# Patient Record
Sex: MALE | HISPANIC OR LATINO | Employment: FULL TIME | URBAN - METROPOLITAN AREA
[De-identification: names, ages, dates, MRNs, and addresses within clinical notes are randomized per-mention and may not be internally consistent; named-entity substitution may affect disease eponyms.]

---

## 2020-08-31 ENCOUNTER — HOSPITAL ENCOUNTER (EMERGENCY)
Facility: MEDICAL CENTER | Age: 25
End: 2020-08-31
Attending: EMERGENCY MEDICINE
Payer: OTHER MISCELLANEOUS

## 2020-08-31 ENCOUNTER — APPOINTMENT (OUTPATIENT)
Dept: RADIOLOGY | Facility: MEDICAL CENTER | Age: 25
End: 2020-08-31
Attending: EMERGENCY MEDICINE
Payer: OTHER MISCELLANEOUS

## 2020-08-31 VITALS
WEIGHT: 303 LBS | DIASTOLIC BLOOD PRESSURE: 67 MMHG | HEIGHT: 74 IN | RESPIRATION RATE: 20 BRPM | TEMPERATURE: 97.2 F | OXYGEN SATURATION: 98 % | HEART RATE: 102 BPM | BODY MASS INDEX: 38.89 KG/M2 | SYSTOLIC BLOOD PRESSURE: 135 MMHG

## 2020-08-31 DIAGNOSIS — R56.9 SEIZURE (HCC): ICD-10-CM

## 2020-08-31 DIAGNOSIS — G40.909 SEIZURE DISORDER (HCC): ICD-10-CM

## 2020-08-31 LAB
ALBUMIN SERPL BCP-MCNC: 5.2 G/DL (ref 3.2–4.9)
ALBUMIN/GLOB SERPL: 1.9 G/DL
ALP SERPL-CCNC: 80 U/L (ref 30–99)
ALT SERPL-CCNC: 56 U/L (ref 2–50)
ANION GAP SERPL CALC-SCNC: 16 MMOL/L (ref 7–16)
AST SERPL-CCNC: 37 U/L (ref 12–45)
BASOPHILS # BLD AUTO: 0.4 % (ref 0–1.8)
BASOPHILS # BLD: 0.05 K/UL (ref 0–0.12)
BILIRUB SERPL-MCNC: 0.4 MG/DL (ref 0.1–1.5)
BUN SERPL-MCNC: 16 MG/DL (ref 8–22)
CALCIUM SERPL-MCNC: 9.9 MG/DL (ref 8.5–10.5)
CHLORIDE SERPL-SCNC: 101 MMOL/L (ref 96–112)
CO2 SERPL-SCNC: 23 MMOL/L (ref 20–33)
CREAT SERPL-MCNC: 0.89 MG/DL (ref 0.5–1.4)
EKG IMPRESSION: NORMAL
EOSINOPHIL # BLD AUTO: 0.07 K/UL (ref 0–0.51)
EOSINOPHIL NFR BLD: 0.5 % (ref 0–6.9)
ERYTHROCYTE [DISTWIDTH] IN BLOOD BY AUTOMATED COUNT: 39.2 FL (ref 35.9–50)
ETHANOL BLD-MCNC: <10.1 MG/DL (ref 0–10.1)
GLOBULIN SER CALC-MCNC: 2.8 G/DL (ref 1.9–3.5)
GLUCOSE BLD-MCNC: 149 MG/DL (ref 65–99)
GLUCOSE SERPL-MCNC: 116 MG/DL (ref 65–99)
HCT VFR BLD AUTO: 51.8 % (ref 42–52)
HGB BLD-MCNC: 17.3 G/DL (ref 14–18)
IMM GRANULOCYTES # BLD AUTO: 0.07 K/UL (ref 0–0.11)
IMM GRANULOCYTES NFR BLD AUTO: 0.5 % (ref 0–0.9)
LYMPHOCYTES # BLD AUTO: 2.15 K/UL (ref 1–4.8)
LYMPHOCYTES NFR BLD: 15.9 % (ref 22–41)
MCH RBC QN AUTO: 28.5 PG (ref 27–33)
MCHC RBC AUTO-ENTMCNC: 33.4 G/DL (ref 33.7–35.3)
MCV RBC AUTO: 85.3 FL (ref 81.4–97.8)
MONOCYTES # BLD AUTO: 0.92 K/UL (ref 0–0.85)
MONOCYTES NFR BLD AUTO: 6.8 % (ref 0–13.4)
NEUTROPHILS # BLD AUTO: 10.23 K/UL (ref 1.82–7.42)
NEUTROPHILS NFR BLD: 75.9 % (ref 44–72)
NRBC # BLD AUTO: 0 K/UL
NRBC BLD-RTO: 0 /100 WBC
PLATELET # BLD AUTO: 284 K/UL (ref 164–446)
PMV BLD AUTO: 10.6 FL (ref 9–12.9)
POTASSIUM SERPL-SCNC: 4 MMOL/L (ref 3.6–5.5)
PROT SERPL-MCNC: 8 G/DL (ref 6–8.2)
RBC # BLD AUTO: 6.07 M/UL (ref 4.7–6.1)
SODIUM SERPL-SCNC: 140 MMOL/L (ref 135–145)
VALPROATE SERPL-MCNC: <2.8 UG/ML (ref 50–100)
WBC # BLD AUTO: 13.5 K/UL (ref 4.8–10.8)

## 2020-08-31 PROCEDURE — 80307 DRUG TEST PRSMV CHEM ANLYZR: CPT

## 2020-08-31 PROCEDURE — 70450 CT HEAD/BRAIN W/O DYE: CPT

## 2020-08-31 PROCEDURE — 85025 COMPLETE CBC W/AUTO DIFF WBC: CPT

## 2020-08-31 PROCEDURE — 80053 COMPREHEN METABOLIC PANEL: CPT

## 2020-08-31 PROCEDURE — 93005 ELECTROCARDIOGRAM TRACING: CPT | Performed by: EMERGENCY MEDICINE

## 2020-08-31 PROCEDURE — 82962 GLUCOSE BLOOD TEST: CPT

## 2020-08-31 PROCEDURE — 96374 THER/PROPH/DIAG INJ IV PUSH: CPT

## 2020-08-31 PROCEDURE — 99284 EMERGENCY DEPT VISIT MOD MDM: CPT

## 2020-08-31 PROCEDURE — 700111 HCHG RX REV CODE 636 W/ 250 OVERRIDE (IP): Performed by: EMERGENCY MEDICINE

## 2020-08-31 PROCEDURE — 36415 COLL VENOUS BLD VENIPUNCTURE: CPT

## 2020-08-31 PROCEDURE — 80164 ASSAY DIPROPYLACETIC ACD TOT: CPT

## 2020-08-31 RX ORDER — LEVETIRACETAM 10 MG/ML
1000 INJECTION INTRAVASCULAR ONCE
Status: COMPLETED | OUTPATIENT
Start: 2020-08-31 | End: 2020-08-31

## 2020-08-31 RX ADMIN — LEVETIRACETAM INJECTION 1000 MG: 10 INJECTION INTRAVENOUS at 02:36

## 2020-08-31 ASSESSMENT — LIFESTYLE VARIABLES: DO YOU DRINK ALCOHOL: NO

## 2020-08-31 ASSESSMENT — ENCOUNTER SYMPTOMS
FEVER: 0
SEIZURES: 1
COUGH: 0

## 2020-08-31 NOTE — ED NOTES
Pt discharged. Pt provided information about seizure. Pt educated to follow-up w/ neurology and to return to the hospital w/ any worsening symptoms. Pt walked to the lobby w/ his sister.

## 2020-08-31 NOTE — LETTER
"  FORM C-4:  EMPLOYEE’S CLAIM FOR COMPENSATION/ REPORT OF INITIAL TREATMENT  EMPLOYEE’S CLAIM - PROVIDE ALL INFORMATION REQUESTED   First Name Esteban Last Name Malena Birthdate 1995  Sex male Claim Number   Home Employee Address [address]                                     Zip  [address] Height  1.88 m (6' 2\") Weight  (!) 137.4 kg (303 lb) [facility]     Mailing Employee Address [address]               Zip  [address] Telephone  [phone] (home)  Primary Language Spoken  English   Insurer   Third Party   MISC WORKERS 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 (if applicable) on Break    How did this injury or occupational disease occur? Be specific and answer in detail. Use additional sheet if necessary)  I was eating then I had a seizure   If you believe that you have an occupational disease, when did you first have knowledge of the disability and it relationship to your employment? NO Witnesses to the Accident  alecia   Nature of Injury or Occupational Disease  Workers' Compensation Part(s) of Body Injured or Affected  Skull, N/A, N/A    I CERTIFY THAT THE ABOVE IS TRUE AND CORRECT TO THE BEST OF MY KNOWLEDGE AND THAT I HAVE PROVIDED THIS INFORMATION IN ORDER TO OBTAIN THE BENEFITS OF [place]).  I HEREBY AUTHORIZE ANY PHYSICIAN, CHIROPRACTOR, SURGEON, " PRACTITIONER, OR OTHER PERSON, ANY HOSPITAL, INCLUDING [facility] OR [facility], ANY MEDICAL SERVICE ORGANIZATION, ANY INSURANCE COMPANY, OR OTHER INSTITUTION OR ORGANIZATION TO RELEASE TO EACH OTHER, ANY MEDICAL OR OTHER INFORMATION, INCLUDING BENEFITS PAID OR PAYABLE, PERTINENT TO THIS INJURY OR DISEASE, EXCEPT INFORMATION RELATIVE TO DIAGNOSIS, TREATMENT AND/OR COUNSELING FOR AIDS, PSYCHOLOGICAL CONDITIONS, ALCOHOL OR CONTROLLED SUBSTANCES, FOR WHICH I MUST GIVE SPECIFIC AUTHORIZATION.  A PHOTOSTAT OF THIS AUTHORIZATION SHALL BE AS VALID AS THE ORIGINAL.  Date   08-31-20     Place                                                                           Employee’s Signature   THIS REPORT MUST BE COMPLETED AND MAILED WITHIN 3 WORKING DAYS OF TREATMENT   Place [facility], EMERGENCY DEPT                                                                             Name of Facility [facility]   Date  8/31/2020 Diagnosis  (R56.9) Seizure (HCC)  (G40.909) Seizure disorder (HCC) Is there evidence the injured employee was under the influence of alcohol and/or another controlled substance at the time of accident?   Hour  3:47 AM Description of Injury or Disease  Seizure (HCC)  Seizure disorder (HCC) No   Treatment  Seizure medication and labs and head scan  Have you advised the patient to remain off work five days or more?         No   X-Ray Findings  Negative If Yes   From Date    To Date      From information given by the employee, together with medical evidence, can you directly connect this injury or occupational disease as job incurred? No  Comments:Has a history of seizure disorder, this is not job incurred If No, is employee capable of: Full Duty  Yes Modified Duty      Is additional medical care by a physician indicated?   Comments:follow-up with neurology If Modified Duty, Specify any Limitations / Restrictions       Do you know of any previous injury or  "disease contributing to this condition or occupational disease? Yes  Comments:Patient has a history of seizure disorder this is not job related    Date 8/31/2020 Print Doctor’s Name Brionna Wright certify the employer’s copy of this form was mailed on:   Address [address] [place] [address]  [place] [employer] [id number] [phone] INSURER’S USE ONLY   Provider’s Tax ID Number [id number] Telephone Dept: [phone]    Doctor’s Signature jeronimo-BRIONNA Phillips M.D. Degree M.D.      Form C-4 (rev.10/07)                                                                         BRIEF DESCRIPTION OF RIGHTS AND BENEFITS  (Pursuant to [place] 616[place])    Notice of Injury or Occupational Disease (Incident Report Form C-1): If an injury or occupational disease (OD) arises out of and in the course of employment, you must provide written notice to your employer as soon as practicable, but no later than 7 days after the accident or OD. Your employer shall maintain a sufficient supply of the required forms.    Claim for Compensation (Form C-4): If medical treatment is sought, the form C-4 is available at the place of initial treatment. A completed \"Claim for Compensation\" (Form C-4) must be filed within 90 days after an accident or OD. The treating physician or chiropractor must, within 3 working days after treatment, complete and mail to the employer, the employer's insurer and third-party , the Claim for Compensation.    Medical Treatment: If you require medical treatment for your on-the-job injury or OD, you may be required to select a physician or chiropractor from a list provided by your workers’ compensation insurer, if it has contracted with an Organization for Managed Care (MCO) or Preferred Provider Organization (PPO) or providers of health care. If your employer has not entered into a contract with an MCO or PPO, you may select a physician or chiropractor from the Panel of Physicians and Chiropractors. Any medical costs " related to your industrial injury or OD will be paid by your insurer.    Temporary Total Disability (TTD): If your doctor has certified that you are unable to work for a period of at least 5 consecutive days, or 5 cumulative days in a 20-day period, or places restrictions on you that your employer does not accommodate, you may be entitled to TTD compensation.    Temporary Partial Disability (TPD): If the wage you receive upon reemployment is less than the compensation for TTD to which you are entitled, the insurer may be required to pay you TPD compensation to make up the difference. TPD can only be paid for a maximum of 24 months.    Permanent Partial Disability (PPD): When your medical condition is stable and there is an indication of a PPD as a result of your injury or OD, within 30 days, your insurer must arrange for an evaluation by a rating physician or chiropractor to determine the degree of your PPD. The amount of your PPD award depends on the date of injury, the results of the PPD evaluation and your age and wage.    Permanent Total Disability (PTD): If you are medically certified by a treating physician or chiropractor as permanently and totally disabled and have been granted a PTD status by your insurer, you are entitled to receive monthly benefits not to exceed 66 2/3% of your average monthly wage. The amount of your PTD payments is subject to reduction if you previously received a PPD award.    Vocational Rehabilitation Services: You may be eligible for vocational rehabilitation services if you are unable to return to the job due to a permanent physical impairment or permanent restrictions as a result of your injury or occupational disease.    Transportation and Per Geni Reimbursement: You may be eligible for travel expenses and per geni associated with medical treatment.    Reopening: You may be able to reopen your claim if your condition worsens after claim closure.     Appeal Process: If you disagree  with a written determination issued by the insurer or the insurer does not respond to your request, you may appeal to the Department of Administration, , by following the instructions contained in your determination letter. You must appeal the determination within 70 days from the date of the determination letter at [address], [address], [place], [place] [address], or [address], [address], [place], [place] [address]. If you disagree with the  decision, you may appeal to the Department of Administration, . You must file your appeal within 30 days from the date of the  decision letter at [address], [address], [place], [place] [address], or [address], [address], [place], [place] [address]. If you disagree with a decision of an , you may file a petition for judicial review with the District Court. You must do so within 30 days of the Appeal Officer’s decision. You may be represented by an  at your own expense or you may contact the [facility] for possible representation.    [place]  for Injured Workers (NAIW): If you disagree with a  decision, you may request that NAIW represent you without charge at an  Hearing. For information regarding denial of benefits, you may contact the [facility] at: [address], [address], [place] [address], [phone], or [address], [address], [place] [address], [phone]    To File a Complaint with the Division: If you wish to file a complaint with the  of the Division of Industrial Relations (DIR),  please contact the Workers’ Compensation Section, [address], [address], [place], [place] [address], telephone [phone], or [address], [address], [place], [place] [address], telephone [phone].    For assistance with Workers’ Compensation Issues: You may contact the  Office of the Governor Consumer Health Assistance, [address] [place] [address], [address], [place] [address], Toll Free 1-678.993.5390, Web site: [email or web address]., E-mail [email or web address].  D-2 (rev. 06/18)              __________________________________________________________________                                    _________________            Employee Name / Signature                                                                                                                            Date

## 2020-08-31 NOTE — ED PROVIDER NOTES
ED Provider Note    Scribed for Brionna Wright M.D. by Gulshan George. 8/31/2020, 1:44 AM.    Primary care provider: Pcp Pt States None  Means of arrival: EMS  History obtained from: Patient  History limited by: None    CHIEF COMPLAINT  Chief Complaint   Patient presents with   • Seizure     pt was on break and had witnessed seizure by coworker. pt has hx of seizure       HPI  Esteban Guy Jr. is a 25 y.o. male who presents to the Emergency Department for evaluation following a witnessed seizure that occurred while he was on break at work.  Patient denies any incontinence, does not have a headache or neck pain from the seizure.  Per EMS his blood sugar was 68. He has a history of seizures and is still on Kepra 750 mg twice daily for this. This has been his dose for about two years now. He usually takes his medication around 3 pm but did miss his evening dose today. Prior to today his last seizure was in March, and then before that was 2 years ago. He denies any recent illness, no cough or fever. He has been stressed and also did not eat very much today. Dr. Cárdenas is his neurologist.  Was previously on Depakote, however this caused liver failure and therefore he does not take this anymore.    REVIEW OF SYSTEMS  Review of Systems   Constitutional: Negative for fever.   Respiratory: Negative for cough.    Neurological: Positive for seizures.   All other systems reviewed and are negative.    PAST MEDICAL HISTORY   has a past medical history of Seizure (HCC).    SURGICAL HISTORY  patient denies any surgical history    SOCIAL HISTORY  Social History     Tobacco Use   • Smoking status: Never Smoker   • Smokeless tobacco: Never Used   Substance Use Topics   • Alcohol use: No   • Drug use: No      Social History     Substance and Sexual Activity   Drug Use No       FAMILY HISTORY  History reviewed. No pertinent family history.    CURRENT MEDICATIONS  Current Outpatient Medications   Medication Instructions   • divalproex  "ER (DEPAKOTE ER) 250 mg, Oral, 2 TIMES DAILY       ALLERGIES  No Known Allergies    PHYSICAL EXAM  VITAL SIGNS: /72   Pulse (!) 116   Temp 36.6 °C (97.8 °F) (Temporal)   Resp 14   Ht 1.88 m (6' 2\")   Wt (!) 137.4 kg (303 lb)   SpO2 93%   BMI 38.90 kg/m²   Vitals reviewed by myself.  Nursing note and vitals reviewed.  Constitutional: Well-developed and well-nourished. No acute distress.   HENT: Head is normocephalic and atraumatic.  Eyes: extra-ocular movements intact  Cardiovascular: Tachycardic rate and regular rhythm. No murmur heard.  Pulmonary/Chest: Breath sounds normal. No wheezes or rales.   Abdominal: Soft and non-tender. No distention.    Musculoskeletal: Extremities exhibit normal range of motion without edema or tenderness.  No midline spinal tenderness. patient has full range of motion of his neck  Neurological: Awake and alert.  Cranial nerves II through XII intact, no focal neurologic deficits  Skin: Skin is warm and dry. No rash.     DIAGNOSTIC STUDIES /  LABS  Labs Reviewed   CBC WITH DIFFERENTIAL - Abnormal; Notable for the following components:       Result Value    WBC 13.5 (*)     MCHC 33.4 (*)     Neutrophils-Polys 75.90 (*)     Lymphocytes 15.90 (*)     Neutrophils (Absolute) 10.23 (*)     Monos (Absolute) 0.92 (*)     All other components within normal limits   VALPROIC ACID - Abnormal; Notable for the following components:    Valproic Acid <2.8 (*)     All other components within normal limits   COMP METABOLIC PANEL - Abnormal; Notable for the following components:    Glucose 116 (*)     ALT(SGPT) 56 (*)     Albumin 5.2 (*)     All other components within normal limits   ACCU-CHEK GLUCOSE - Abnormal; Notable for the following components:    Glucose - Accu-Ck 149 (*)     All other components within normal limits   DIAGNOSTIC ALCOHOL   ESTIMATED GFR   URINE DRUG SCREEN       All labs reviewed by me.    EKG Interpretation:  Interpreted by myself    12 Lead EKG interpreted by me to " show:  EKG at 1:37 AM: Sinus tachycardia, heart rate 115, normal axis, normal intervals, , QRS 98, QTc 437, no acute ST-T segment changes, diffuse ST elevation consistent with benign early repolarization, no evidence of acute arrhythmia or ischemia  My impression of this EKG: Does not indicate ischemia or arrhythmia at this time.    RADIOLOGY  CT-HEAD W/O   Final Result         1.  No acute intracranial abnormality, stable since prior study.           The radiologist's interpretation of all radiological studies have been reviewed by me.    REASSESSMENT    1:44 AM - Patient seen and examined at bedside. Discussed plan of care, including labs and imaging. Patient agrees to the plan of care. The patient will be medicated with Keppra. Ordered for labs and imaging to evaluate his symptoms.   .  3:38 AM - Patient was reevaluated at bedside. Discussed lab and radiology results with the patient and informed them that still awaiting CT results.     3:45 AM - Patient was reevaluated at bedside. Discussed lab and radiology results with the patient and informed them that they are reassuring. Advised follow up with his neurologist to see if his medications need to be adjusted. Discussed discharge instructions and return precautions with the patient and they were cleared for discharge. Patient was given the opportunity to ask any further questions. He is comfortable with discharge at this time.      PPE Note: I verified that the patient was wearing a mask and I was wearing appropriate PPE every time I entered the room. The patient's mask was on the patient at all times during my encounter except for a brief view of the oropharynx.     Scribe remained outside the patient's room and did not have any contact with the patient for the duration of patient encounter.     COURSE & MEDICAL DECISION MAKING  Nursing notes, VS, PMSFHx reviewed in chart.    Patient is a 25-year-old male who comes in for evaluation of seizure.   Differential diagnosis includes seizure disorder, medication noncompliance, breakthrough seizure, electrolyte disturbance, intracranial abnormality.  Diagnostic work-up includes labs and CT of the head.  Of note screening EKG was done in triage which was unremarkable.  Initially patient was uncertain about his medications and thought he was on Depakote and therefore Depakote level was ordered, however patient sister ultimately clarified that patient takes Keppra after taking a picture of the medication bottle.    Patient's initial vitals are notable for tachycardia, patient is neurologically intact, he is not postictal on exam.  Patient is loaded with 1 g of Keppra.  In the emergency department patient has no recurrent seizures.  Labs returned and are unremarkable.  CT of the head demonstrates no acute abnormalities.  Therefore patient is reassured and advised to follow-up with his neurologist.  I advised him that this may have been a breakthrough seizure secondary to missing his afternoon dose of medication versus dehydration versus stress.  Patient understands and is agreeable to this plan.  He is given strict return precautions and discharged in stable condition.    The patient will return for new or worsening symptoms and is stable at the time of discharge.    The patient is referred to a primary physician for blood pressure management, diabetic screening, and for all other preventative health concerns.    DISPOSITION:  Patient will be discharged home in stable condition.    FOLLOW UP:  Santiago Cárdenas M.D.  [address]  [facility] [address]  [phone]    Schedule an appointment as soon as possible for a visit         FINAL IMPRESSION  1. Seizure (HCC)    2. Seizure disorder (HCC)        Gulshan BEAR), am scribing for, and in the presence of, Brionna Wright M.D..    Electronically signed by: Gulshan Galarza), 8/31/2020    Brionna BEAR M.D. personally performed the  services described in this documentation, as scribed by Gulshan George in my presence, and it is both accurate and complete. C      The note accurately reflects work and decisions made by me.  Brionna Wright M.D.  8/31/2020  4:44 AM

## 2020-08-31 NOTE — LETTER
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 (if applicable) on Break    How did this injury or occupational disease occur? Be specific and answer in detail. Use additional sheet if necessary)  I was eating then I had a seizure   If you believe that you have an occupational disease, when did you first have knowledge of the disability and it relationship to your employment? NO Witnesses to the Accident  alecia   Nature of Injury or Occupational Disease  Workers' Compensation Part(s) of Body Injured or Affected  Skull, N/A, N/A    I CERTIFY THAT THE ABOVE IS TRUE AND CORRECT TO THE BEST OF MY KNOWLEDGE AND THAT I HAVE PROVIDED THIS INFORMATION IN ORDER TO OBTAIN THE BENEFITS OF [place]).  I HEREBY AUTHORIZE ANY PHYSICIAN, CHIROPRACTOR, " SURGEON, PRACTITIONER, OR OTHER PERSON, ANY HOSPITAL, INCLUDING [facility] OR [facility], ANY MEDICAL SERVICE ORGANIZATION, ANY INSURANCE COMPANY, OR OTHER INSTITUTION OR ORGANIZATION TO RELEASE TO EACH OTHER, ANY MEDICAL OR OTHER INFORMATION, INCLUDING BENEFITS PAID OR PAYABLE, PERTINENT TO THIS INJURY OR DISEASE, EXCEPT INFORMATION RELATIVE TO DIAGNOSIS, TREATMENT AND/OR COUNSELING FOR AIDS, PSYCHOLOGICAL CONDITIONS, ALCOHOL OR CONTROLLED SUBSTANCES, FOR WHICH I MUST GIVE SPECIFIC AUTHORIZATION.  A PHOTOSTAT OF THIS AUTHORIZATION SHALL BE AS VALID AS THE ORIGINAL.  Date  08/31/20   [facility]                  Employee’s Signature   THIS REPORT MUST BE COMPLETED AND MAILED WITHIN 3 WORKING DAYS OF TREATMENT   Place [facility], EMERGENCY DEPT                                                                             Name of Facility [facility]   Date  8/31/2020 Diagnosis  (R56.9) Seizure (HCC)  (G40.909) Seizure disorder (HCC) Is there evidence the injured employee was under the influence of alcohol and/or another controlled substance at the time of accident?   Hour  3:56 AM Description of Injury or Disease  Seizure (HCC)  Seizure disorder (HCC) No   Treatment  Seizure medication and labs and head scan  Have you advised the patient to remain off work five days or more?         No   X-Ray Findings  Negative If Yes   From Date    To Date      From information given by the employee, together with medical evidence, can you directly connect this injury or occupational disease as job incurred? No  Comments:Has a history of seizure disorder, this is not job incurred If No, is employee capable of: Full Duty  Yes Modified Duty      Is additional medical care by a physician indicated?   Comments:follow-up with neurology If Modified Duty, Specify any Limitations / Restrictions       Do you know of any previous injury or disease  "contributing to this condition or occupational disease? Yes  Comments:Patient has a history of seizure disorder this is not job related    Date 8/31/2020 Print Doctor’s Name Brionna Wright certify the employer’s copy of this form was mailed on:   Address [address] [place] [address]  [place] [employer] [id number] [phone] INSURER’S USE ONLY   Provider’s Tax ID Number [id number] Telephone Dept: [phone]    Doctor’s Signature e-BRIONNA Phillips M.D. Degree M.D.      Form C-4 (rev.10/07)                                                                         BRIEF DESCRIPTION OF RIGHTS AND BENEFITS  (Pursuant to [place] 616C.050)    Notice of Injury or Occupational Disease (Incident Report Form C-1): If an injury or occupational disease (OD) arises out of and in the course of employment, you must provide written notice to your employer as soon as practicable, but no later than 7 days after the accident or OD. Your employer shall maintain a sufficient supply of the required forms.    Claim for Compensation (Form C-4): If medical treatment is sought, the form C-4 is available at the place of initial treatment. A completed \"Claim for Compensation\" (Form C-4) must be filed within 90 days after an accident or OD. The treating physician or chiropractor must, within 3 working days after treatment, complete and mail to the employer, the employer's insurer and third-party , the Claim for Compensation.    Medical Treatment: If you require medical treatment for your on-the-job injury or OD, you may be required to select a physician or chiropractor from a list provided by your workers’ compensation insurer, if it has contracted with an Organization for Managed Care (MCO) or Preferred Provider Organization (PPO) or providers of health care. If your employer has not entered into a contract with an MCO or PPO, you may select a physician or chiropractor from the Panel of Physicians and Chiropractors. Any medical costs related to " your industrial injury or OD will be paid by your insurer.    Temporary Total Disability (TTD): If your doctor has certified that you are unable to work for a period of at least 5 consecutive days, or 5 cumulative days in a 20-day period, or places restrictions on you that your employer does not accommodate, you may be entitled to TTD compensation.    Temporary Partial Disability (TPD): If the wage you receive upon reemployment is less than the compensation for TTD to which you are entitled, the insurer may be required to pay you TPD compensation to make up the difference. TPD can only be paid for a maximum of 24 months.    Permanent Partial Disability (PPD): When your medical condition is stable and there is an indication of a PPD as a result of your injury or OD, within 30 days, your insurer must arrange for an evaluation by a rating physician or chiropractor to determine the degree of your PPD. The amount of your PPD award depends on the date of injury, the results of the PPD evaluation and your age and wage.    Permanent Total Disability (PTD): If you are medically certified by a treating physician or chiropractor as permanently and totally disabled and have been granted a PTD status by your insurer, you are entitled to receive monthly benefits not to exceed 66 2/3% of your average monthly wage. The amount of your PTD payments is subject to reduction if you previously received a PPD award.    Vocational Rehabilitation Services: You may be eligible for vocational rehabilitation services if you are unable to return to the job due to a permanent physical impairment or permanent restrictions as a result of your injury or occupational disease.    Transportation and Per Geni Reimbursement: You may be eligible for travel expenses and per geni associated with medical treatment.    Reopening: You may be able to reopen your claim if your condition worsens after claim closure.     Appeal Process: If you disagree with a  written determination issued by the insurer or the insurer does not respond to your request, you may appeal to the Department of Administration, , by following the instructions contained in your determination letter. You must appeal the determination within 70 days from the date of the determination letter at [address], [address], [place], [place] [address], or [address], [address], [place], [place] [address]. If you disagree with the  decision, you may appeal to the Department of Administration, . You must file your appeal within 30 days from the date of the  decision letter at [address], [address], [place], [place] [address], or [address], [address], [place], [place] [address]. If you disagree with a decision of an , you may file a petition for judicial review with the District Court. You must do so within 30 days of the Appeal Officer’s decision. You may be represented by an  at your own expense or you may contact the [facility] for possible representation.    [place]  for Injured Workers (NAIW): If you disagree with a  decision, you may request that NAIW represent you without charge at an  Hearing. For information regarding denial of benefits, you may contact the [facility] at: [address], [address], [place] [address], [phone], or [address], [address], [place] [address], [phone]    To File a Complaint with the Division: If you wish to file a complaint with the  of the Division of Industrial Relations (DIR),  please contact the Workers’ Compensation Section, [address], [place], [place] [address], telephone [phone], or [address], [address], [place], [place] [address], telephone [phone].    For assistance with Workers’ Compensation Issues: You may contact the Office of  the [facility] Consumer Health Assistance, [address] [place] [address], [address], [place] [address], Toll Free 1-694.251.2779, Web site: http://[email or web address]., E-mail [email or web address].  D-2 (rev. 06/18)              __________________________________________________________________                                    08/31/20            Employee Name / Signature                                                                                                                            Date

## 2020-08-31 NOTE — ED TRIAGE NOTES
"Chief Complaint   Patient presents with   • Seizure     pt was on break and had witnessed seizure by coworker. pt has hx of seizure     Pt BIB EMS for above complaint. Pt was at work and had witnessed seizure that lasted about 30sec. Pt denies any aura or trigger for the seizure. Pt has hx of seizure and takes keppra twice daily but missed his evening dose. Pt is alert and oriented. Pt has some bite marks on his tongue. Pt complains of bilat calf pain cramping sensation. Seizure precautions in place. Pt placed in gown and VS in place.    /72   Pulse (!) 116   Temp 36.6 °C (97.8 °F) (Temporal)   Resp 14   Ht 1.88 m (6' 2\")   Wt (!) 137.4 kg (303 lb)   SpO2 93%   BMI 38.90 kg/m²     "

## 2024-07-03 ENCOUNTER — HOSPITAL ENCOUNTER (INPATIENT)
Facility: MEDICAL CENTER | Age: 29
LOS: 3 days | DRG: 281 | End: 2024-07-06
Attending: EMERGENCY MEDICINE | Admitting: INTERNAL MEDICINE
Payer: MEDICAID

## 2024-07-03 DIAGNOSIS — I21.4 NSTEMI (NON-ST ELEVATED MYOCARDIAL INFARCTION) (HCC): ICD-10-CM

## 2024-07-03 LAB
ALBUMIN SERPL BCP-MCNC: 4.4 G/DL (ref 3.2–4.9)
ALBUMIN/GLOB SERPL: 1.5 G/DL
ALP SERPL-CCNC: 64 U/L (ref 30–99)
ALT SERPL-CCNC: 27 U/L (ref 2–50)
ANION GAP SERPL CALC-SCNC: 13 MMOL/L (ref 7–16)
APTT PPP: 32.1 SEC (ref 24.7–36)
AST SERPL-CCNC: 35 U/L (ref 12–45)
BASOPHILS # BLD AUTO: 0.6 % (ref 0–1.8)
BASOPHILS # BLD: 0.04 K/UL (ref 0–0.12)
BILIRUB SERPL-MCNC: 0.6 MG/DL (ref 0.1–1.5)
BUN SERPL-MCNC: 18 MG/DL (ref 8–22)
CALCIUM ALBUM COR SERPL-MCNC: 9 MG/DL (ref 8.5–10.5)
CALCIUM SERPL-MCNC: 9.3 MG/DL (ref 8.5–10.5)
CHLORIDE SERPL-SCNC: 108 MMOL/L (ref 96–112)
CO2 SERPL-SCNC: 22 MMOL/L (ref 20–33)
CREAT SERPL-MCNC: 0.57 MG/DL (ref 0.5–1.4)
CRP SERPL HS-MCNC: 1.36 MG/DL (ref 0–0.75)
EKG IMPRESSION: NORMAL
EOSINOPHIL # BLD AUTO: 0.11 K/UL (ref 0–0.51)
EOSINOPHIL NFR BLD: 1.6 % (ref 0–6.9)
ERYTHROCYTE [DISTWIDTH] IN BLOOD BY AUTOMATED COUNT: 41 FL (ref 35.9–50)
ERYTHROCYTE [SEDIMENTATION RATE] IN BLOOD BY WESTERGREN METHOD: 11 MM/HOUR (ref 0–20)
GFR SERPLBLD CREATININE-BSD FMLA CKD-EPI: 138 ML/MIN/1.73 M 2
GLOBULIN SER CALC-MCNC: 2.9 G/DL (ref 1.9–3.5)
GLUCOSE SERPL-MCNC: 95 MG/DL (ref 65–99)
HCT VFR BLD AUTO: 46.4 % (ref 42–52)
HGB BLD-MCNC: 15.4 G/DL (ref 14–18)
IMM GRANULOCYTES # BLD AUTO: 0.02 K/UL (ref 0–0.11)
IMM GRANULOCYTES NFR BLD AUTO: 0.3 % (ref 0–0.9)
INR PPP: 1.04 (ref 0.87–1.13)
INR PPP: 1.08 (ref 0.87–1.13)
LYMPHOCYTES # BLD AUTO: 3.08 K/UL (ref 1–4.8)
LYMPHOCYTES NFR BLD: 45 % (ref 22–41)
MCH RBC QN AUTO: 27.4 PG (ref 27–33)
MCHC RBC AUTO-ENTMCNC: 33.2 G/DL (ref 32.3–36.5)
MCV RBC AUTO: 82.6 FL (ref 81.4–97.8)
MONOCYTES # BLD AUTO: 0.88 K/UL (ref 0–0.85)
MONOCYTES NFR BLD AUTO: 12.8 % (ref 0–13.4)
NEUTROPHILS # BLD AUTO: 2.72 K/UL (ref 1.82–7.42)
NEUTROPHILS NFR BLD: 39.7 % (ref 44–72)
NRBC # BLD AUTO: 0 K/UL
NRBC BLD-RTO: 0 /100 WBC (ref 0–0.2)
NT-PROBNP SERPL IA-MCNC: 41 PG/ML (ref 0–125)
PLATELET # BLD AUTO: 204 K/UL (ref 164–446)
PMV BLD AUTO: 10.4 FL (ref 9–12.9)
POTASSIUM SERPL-SCNC: 4.1 MMOL/L (ref 3.6–5.5)
PROT SERPL-MCNC: 7.3 G/DL (ref 6–8.2)
PROTHROMBIN TIME: 13.8 SEC (ref 12–14.6)
PROTHROMBIN TIME: 14.1 SEC (ref 12–14.6)
RBC # BLD AUTO: 5.62 M/UL (ref 4.7–6.1)
SODIUM SERPL-SCNC: 143 MMOL/L (ref 135–145)
TROPONIN T SERPL-MCNC: 277 NG/L (ref 6–19)
TROPONIN T SERPL-MCNC: 322 NG/L (ref 6–19)
UFH PPP CHRO-ACNC: 0.36 IU/ML
UFH PPP CHRO-ACNC: 0.52 IU/ML
WBC # BLD AUTO: 6.9 K/UL (ref 4.8–10.8)

## 2024-07-03 PROCEDURE — 770020 HCHG ROOM/CARE - TELE (206)

## 2024-07-03 PROCEDURE — 93005 ELECTROCARDIOGRAM TRACING: CPT | Performed by: EMERGENCY MEDICINE

## 2024-07-03 PROCEDURE — 85520 HEPARIN ASSAY: CPT

## 2024-07-03 PROCEDURE — 96365 THER/PROPH/DIAG IV INF INIT: CPT

## 2024-07-03 PROCEDURE — 86140 C-REACTIVE PROTEIN: CPT

## 2024-07-03 PROCEDURE — 85027 COMPLETE CBC AUTOMATED: CPT

## 2024-07-03 PROCEDURE — 80048 BASIC METABOLIC PNL TOTAL CA: CPT

## 2024-07-03 PROCEDURE — 93005 ELECTROCARDIOGRAM TRACING: CPT | Performed by: INTERNAL MEDICINE

## 2024-07-03 PROCEDURE — 700111 HCHG RX REV CODE 636 W/ 250 OVERRIDE (IP): Performed by: INTERNAL MEDICINE

## 2024-07-03 PROCEDURE — 80061 LIPID PANEL: CPT

## 2024-07-03 PROCEDURE — 99291 CRITICAL CARE FIRST HOUR: CPT | Performed by: INTERNAL MEDICINE

## 2024-07-03 PROCEDURE — 93010 ELECTROCARDIOGRAM REPORT: CPT | Performed by: INTERNAL MEDICINE

## 2024-07-03 PROCEDURE — 85652 RBC SED RATE AUTOMATED: CPT

## 2024-07-03 PROCEDURE — 85025 COMPLETE CBC W/AUTO DIFF WBC: CPT

## 2024-07-03 PROCEDURE — 83880 ASSAY OF NATRIURETIC PEPTIDE: CPT

## 2024-07-03 PROCEDURE — 80053 COMPREHEN METABOLIC PANEL: CPT

## 2024-07-03 PROCEDURE — A9270 NON-COVERED ITEM OR SERVICE: HCPCS | Performed by: INTERNAL MEDICINE

## 2024-07-03 PROCEDURE — 36415 COLL VENOUS BLD VENIPUNCTURE: CPT

## 2024-07-03 PROCEDURE — 85610 PROTHROMBIN TIME: CPT

## 2024-07-03 PROCEDURE — 700102 HCHG RX REV CODE 250 W/ 637 OVERRIDE(OP): Performed by: INTERNAL MEDICINE

## 2024-07-03 PROCEDURE — 84484 ASSAY OF TROPONIN QUANT: CPT | Mod: 91

## 2024-07-03 PROCEDURE — 94760 N-INVAS EAR/PLS OXIMETRY 1: CPT

## 2024-07-03 PROCEDURE — 99285 EMERGENCY DEPT VISIT HI MDM: CPT

## 2024-07-03 PROCEDURE — 85730 THROMBOPLASTIN TIME PARTIAL: CPT

## 2024-07-03 RX ORDER — HEPARIN SODIUM 1000 [USP'U]/ML
4000 INJECTION, SOLUTION INTRAVENOUS; SUBCUTANEOUS ONCE
Status: DISCONTINUED | OUTPATIENT
Start: 2024-07-03 | End: 2024-07-03

## 2024-07-03 RX ORDER — HEPARIN SODIUM 5000 [USP'U]/100ML
0-30 INJECTION, SOLUTION INTRAVENOUS CONTINUOUS
Status: DISCONTINUED | OUTPATIENT
Start: 2024-07-03 | End: 2024-07-03

## 2024-07-03 RX ORDER — LEVETIRACETAM 500 MG/1
1000 TABLET ORAL 2 TIMES DAILY
COMMUNITY

## 2024-07-03 RX ORDER — HEPARIN SODIUM 1000 [USP'U]/ML
2000 INJECTION, SOLUTION INTRAVENOUS; SUBCUTANEOUS PRN
Status: DISCONTINUED | OUTPATIENT
Start: 2024-07-03 | End: 2024-07-03

## 2024-07-03 RX ORDER — HEPARIN SODIUM 5000 [USP'U]/100ML
0-30 INJECTION, SOLUTION INTRAVENOUS CONTINUOUS
Status: DISCONTINUED | OUTPATIENT
Start: 2024-07-03 | End: 2024-07-05

## 2024-07-03 RX ORDER — LEVETIRACETAM 500 MG/1
1000 TABLET ORAL 2 TIMES DAILY
Status: DISCONTINUED | OUTPATIENT
Start: 2024-07-03 | End: 2024-07-06 | Stop reason: HOSPADM

## 2024-07-03 RX ORDER — ASPIRIN 81 MG/1
81 TABLET ORAL DAILY
Status: DISCONTINUED | OUTPATIENT
Start: 2024-07-04 | End: 2024-07-06 | Stop reason: HOSPADM

## 2024-07-03 RX ORDER — METOPROLOL TARTRATE 25 MG/1
12.5 TABLET, FILM COATED ORAL TWICE DAILY
Status: DISCONTINUED | OUTPATIENT
Start: 2024-07-03 | End: 2024-07-06

## 2024-07-03 RX ORDER — HYDRALAZINE HYDROCHLORIDE 20 MG/ML
10 INJECTION INTRAMUSCULAR; INTRAVENOUS EVERY 4 HOURS PRN
Status: DISCONTINUED | OUTPATIENT
Start: 2024-07-03 | End: 2024-07-06 | Stop reason: HOSPADM

## 2024-07-03 RX ORDER — ACETAMINOPHEN 325 MG/1
650 TABLET ORAL EVERY 6 HOURS PRN
Status: DISCONTINUED | OUTPATIENT
Start: 2024-07-03 | End: 2024-07-06 | Stop reason: HOSPADM

## 2024-07-03 RX ORDER — HEPARIN SODIUM 1000 [USP'U]/ML
2000 INJECTION, SOLUTION INTRAVENOUS; SUBCUTANEOUS PRN
Status: DISCONTINUED | OUTPATIENT
Start: 2024-07-03 | End: 2024-07-05

## 2024-07-03 RX ORDER — NITROGLYCERIN 0.4 MG/1
0.4 TABLET SUBLINGUAL
Status: DISCONTINUED | OUTPATIENT
Start: 2024-07-03 | End: 2024-07-06 | Stop reason: HOSPADM

## 2024-07-03 RX ORDER — MORPHINE SULFATE 4 MG/ML
2-4 INJECTION INTRAVENOUS
Status: DISCONTINUED | OUTPATIENT
Start: 2024-07-03 | End: 2024-07-06 | Stop reason: HOSPADM

## 2024-07-03 RX ADMIN — METOPROLOL TARTRATE 12.5 MG: 25 TABLET, FILM COATED ORAL at 20:42

## 2024-07-03 RX ADMIN — HEPARIN SODIUM 12 UNITS/KG/HR: 5000 INJECTION, SOLUTION INTRAVENOUS at 19:39

## 2024-07-03 RX ADMIN — LEVETIRACETAM 1000 MG: 500 TABLET, FILM COATED ORAL at 20:41

## 2024-07-03 SDOH — ECONOMIC STABILITY: TRANSPORTATION INSECURITY
IN THE PAST 12 MONTHS, HAS LACK OF RELIABLE TRANSPORTATION KEPT YOU FROM MEDICAL APPOINTMENTS, MEETINGS, WORK OR FROM GETTING THINGS NEEDED FOR DAILY LIVING?: NO

## 2024-07-03 SDOH — ECONOMIC STABILITY: TRANSPORTATION INSECURITY
IN THE PAST 12 MONTHS, HAS THE LACK OF TRANSPORTATION KEPT YOU FROM MEDICAL APPOINTMENTS OR FROM GETTING MEDICATIONS?: NO

## 2024-07-03 ASSESSMENT — COPD QUESTIONNAIRES
DURING THE PAST 4 WEEKS HOW MUCH DID YOU FEEL SHORT OF BREATH: NONE/LITTLE OF THE TIME
HAVE YOU SMOKED AT LEAST 100 CIGARETTES IN YOUR ENTIRE LIFE: NO/DON'T KNOW
DO YOU EVER COUGH UP ANY MUCUS OR PHLEGM?: NO/ONLY WITH OCCASIONAL COLDS OR INFECTIONS
COPD SCREENING SCORE: 0

## 2024-07-03 ASSESSMENT — COGNITIVE AND FUNCTIONAL STATUS - GENERAL
SUGGESTED CMS G CODE MODIFIER DAILY ACTIVITY: CH
SUGGESTED CMS G CODE MODIFIER MOBILITY: CH
MOBILITY SCORE: 24
DAILY ACTIVITIY SCORE: 24

## 2024-07-03 ASSESSMENT — LIFESTYLE VARIABLES
AVERAGE NUMBER OF DAYS PER WEEK YOU HAVE A DRINK CONTAINING ALCOHOL: 0
EVER FELT BAD OR GUILTY ABOUT YOUR DRINKING: NO
TOTAL SCORE: 0
ALCOHOL_USE: NO
AVERAGE NUMBER OF DAYS PER WEEK YOU HAVE A DRINK CONTAINING ALCOHOL: 0
EVER FELT BAD OR GUILTY ABOUT YOUR DRINKING: NO
ALCOHOL_USE: NO
HOW MANY TIMES IN THE PAST YEAR HAVE YOU HAD 5 OR MORE DRINKS IN A DAY: 0
DOES PATIENT WANT TO STOP DRINKING: NO
CONSUMPTION TOTAL: INCOMPLETE
HOW MANY TIMES IN THE PAST YEAR HAVE YOU HAD 5 OR MORE DRINKS IN A DAY: 0
ON A TYPICAL DAY WHEN YOU DRINK ALCOHOL HOW MANY DRINKS DO YOU HAVE: 0
EVER HAD A DRINK FIRST THING IN THE MORNING TO STEADY YOUR NERVES TO GET RID OF A HANGOVER: NO
HAVE PEOPLE ANNOYED YOU BY CRITICIZING YOUR DRINKING: NO
ON A TYPICAL DAY WHEN YOU DRINK ALCOHOL HOW MANY DRINKS DO YOU HAVE: 0
HAVE YOU EVER FELT YOU SHOULD CUT DOWN ON YOUR DRINKING: NO
CONSUMPTION TOTAL: NEGATIVE
TOTAL SCORE: 0
HAVE YOU EVER FELT YOU SHOULD CUT DOWN ON YOUR DRINKING: NO
DOES PATIENT WANT TO STOP DRINKING: NO
TOTAL SCORE: 0
HAVE PEOPLE ANNOYED YOU BY CRITICIZING YOUR DRINKING: NO

## 2024-07-03 ASSESSMENT — SOCIAL DETERMINANTS OF HEALTH (SDOH)
IN THE PAST 12 MONTHS, HAS THE ELECTRIC, GAS, OIL, OR WATER COMPANY THREATENED TO SHUT OFF SERVICE IN YOUR HOME?: NO
WITHIN THE PAST 12 MONTHS, YOU WORRIED THAT YOUR FOOD WOULD RUN OUT BEFORE YOU GOT THE MONEY TO BUY MORE: NEVER TRUE
WITHIN THE LAST YEAR, HAVE TO BEEN RAPED OR FORCED TO HAVE ANY KIND OF SEXUAL ACTIVITY BY YOUR PARTNER OR EX-PARTNER?: NO
WITHIN THE PAST 12 MONTHS, THE FOOD YOU BOUGHT JUST DIDN'T LAST AND YOU DIDN'T HAVE MONEY TO GET MORE: NEVER TRUE
WITHIN THE LAST YEAR, HAVE YOU BEEN HUMILIATED OR EMOTIONALLY ABUSED IN OTHER WAYS BY YOUR PARTNER OR EX-PARTNER?: NO
WITHIN THE LAST YEAR, HAVE YOU BEEN KICKED, HIT, SLAPPED, OR OTHERWISE PHYSICALLY HURT BY YOUR PARTNER OR EX-PARTNER?: NO
WITHIN THE LAST YEAR, HAVE YOU BEEN AFRAID OF YOUR PARTNER OR EX-PARTNER?: NO

## 2024-07-03 ASSESSMENT — PATIENT HEALTH QUESTIONNAIRE - PHQ9
2. FEELING DOWN, DEPRESSED, IRRITABLE, OR HOPELESS: NOT AT ALL
1. LITTLE INTEREST OR PLEASURE IN DOING THINGS: NOT AT ALL
SUM OF ALL RESPONSES TO PHQ9 QUESTIONS 1 AND 2: 0

## 2024-07-03 ASSESSMENT — ENCOUNTER SYMPTOMS: SHORTNESS OF BREATH: 1

## 2024-07-03 ASSESSMENT — FIBROSIS 4 INDEX
FIB4 SCORE: 0.86
FIB4 SCORE: 0.86

## 2024-07-03 ASSESSMENT — PAIN DESCRIPTION - PAIN TYPE: TYPE: ACUTE PAIN

## 2024-07-04 ENCOUNTER — APPOINTMENT (OUTPATIENT)
Dept: CARDIOLOGY | Facility: MEDICAL CENTER | Age: 29
DRG: 281 | End: 2024-07-04
Attending: INTERNAL MEDICINE
Payer: MEDICAID

## 2024-07-04 ENCOUNTER — APPOINTMENT (OUTPATIENT)
Dept: RADIOLOGY | Facility: MEDICAL CENTER | Age: 29
DRG: 281 | End: 2024-07-04
Payer: MEDICAID

## 2024-07-04 LAB
AMPHET UR QL SCN: NEGATIVE
ANION GAP SERPL CALC-SCNC: 11 MMOL/L (ref 7–16)
BARBITURATES UR QL SCN: NEGATIVE
BENZODIAZ UR QL SCN: NEGATIVE
BUN SERPL-MCNC: 17 MG/DL (ref 8–22)
BZE UR QL SCN: NEGATIVE
CALCIUM SERPL-MCNC: 9.1 MG/DL (ref 8.5–10.5)
CANNABINOIDS UR QL SCN: NEGATIVE
CHLORIDE SERPL-SCNC: 107 MMOL/L (ref 96–112)
CHOLEST SERPL-MCNC: 159 MG/DL (ref 100–199)
CO2 SERPL-SCNC: 24 MMOL/L (ref 20–33)
CREAT SERPL-MCNC: 0.63 MG/DL (ref 0.5–1.4)
EKG IMPRESSION: NORMAL
ERYTHROCYTE [DISTWIDTH] IN BLOOD BY AUTOMATED COUNT: 42 FL (ref 35.9–50)
FENTANYL UR QL: NEGATIVE
GFR SERPLBLD CREATININE-BSD FMLA CKD-EPI: 134 ML/MIN/1.73 M 2
GLUCOSE SERPL-MCNC: 95 MG/DL (ref 65–99)
HCT VFR BLD AUTO: 44.9 % (ref 42–52)
HDLC SERPL-MCNC: 31 MG/DL
HGB BLD-MCNC: 14.7 G/DL (ref 14–18)
LDLC SERPL CALC-MCNC: 104 MG/DL
LV EJECT FRACT  99904: 55
LV EJECT FRACT MOD 2C 99903: 50.82
LV EJECT FRACT MOD 4C 99902: 50.16
LV EJECT FRACT MOD BP 99901: 50
MCH RBC QN AUTO: 27.4 PG (ref 27–33)
MCHC RBC AUTO-ENTMCNC: 32.7 G/DL (ref 32.3–36.5)
MCV RBC AUTO: 83.8 FL (ref 81.4–97.8)
METHADONE UR QL SCN: NEGATIVE
OPIATES UR QL SCN: NEGATIVE
OXYCODONE UR QL SCN: NEGATIVE
PCP UR QL SCN: NEGATIVE
PLATELET # BLD AUTO: 206 K/UL (ref 164–446)
PMV BLD AUTO: 10.4 FL (ref 9–12.9)
POTASSIUM SERPL-SCNC: 3.8 MMOL/L (ref 3.6–5.5)
PROPOXYPH UR QL SCN: NEGATIVE
RBC # BLD AUTO: 5.36 M/UL (ref 4.7–6.1)
RHEUMATOID FACT SER IA-ACNC: <10 IU/ML (ref 0–14)
SODIUM SERPL-SCNC: 142 MMOL/L (ref 135–145)
TRIGL SERPL-MCNC: 122 MG/DL (ref 0–149)
TROPONIN T SERPL-MCNC: 232 NG/L (ref 6–19)
TROPONIN T SERPL-MCNC: 282 NG/L (ref 6–19)
TROPONIN T SERPL-MCNC: 347 NG/L (ref 6–19)
UFH PPP CHRO-ACNC: 0.5 IU/ML
UFH PPP CHRO-ACNC: 0.66 IU/ML
WBC # BLD AUTO: 8.1 K/UL (ref 4.8–10.8)

## 2024-07-04 PROCEDURE — 93010 ELECTROCARDIOGRAM REPORT: CPT | Performed by: INTERNAL MEDICINE

## 2024-07-04 PROCEDURE — A9270 NON-COVERED ITEM OR SERVICE: HCPCS | Performed by: INTERNAL MEDICINE

## 2024-07-04 PROCEDURE — 99254 IP/OBS CNSLTJ NEW/EST MOD 60: CPT | Mod: FS | Performed by: INTERNAL MEDICINE

## 2024-07-04 PROCEDURE — 86431 RHEUMATOID FACTOR QUANT: CPT

## 2024-07-04 PROCEDURE — 99233 SBSQ HOSP IP/OBS HIGH 50: CPT | Mod: GC | Performed by: HOSPITALIST

## 2024-07-04 PROCEDURE — 770020 HCHG ROOM/CARE - TELE (206)

## 2024-07-04 PROCEDURE — 93306 TTE W/DOPPLER COMPLETE: CPT | Mod: 26 | Performed by: INTERNAL MEDICINE

## 2024-07-04 PROCEDURE — 86658 ENTEROVIRUS ANTIBODY: CPT

## 2024-07-04 PROCEDURE — 86039 ANTINUCLEAR ANTIBODIES (ANA): CPT

## 2024-07-04 PROCEDURE — 86225 DNA ANTIBODY NATIVE: CPT

## 2024-07-04 PROCEDURE — 85520 HEPARIN ASSAY: CPT

## 2024-07-04 PROCEDURE — 86235 NUCLEAR ANTIGEN ANTIBODY: CPT | Mod: 91

## 2024-07-04 PROCEDURE — 80307 DRUG TEST PRSMV CHEM ANLYZR: CPT

## 2024-07-04 PROCEDURE — 71045 X-RAY EXAM CHEST 1 VIEW: CPT

## 2024-07-04 PROCEDURE — 700117 HCHG RX CONTRAST REV CODE 255: Performed by: INTERNAL MEDICINE

## 2024-07-04 PROCEDURE — 86038 ANTINUCLEAR ANTIBODIES: CPT

## 2024-07-04 PROCEDURE — 93306 TTE W/DOPPLER COMPLETE: CPT

## 2024-07-04 PROCEDURE — 36415 COLL VENOUS BLD VENIPUNCTURE: CPT

## 2024-07-04 PROCEDURE — 93005 ELECTROCARDIOGRAM TRACING: CPT

## 2024-07-04 PROCEDURE — 700102 HCHG RX REV CODE 250 W/ 637 OVERRIDE(OP): Performed by: INTERNAL MEDICINE

## 2024-07-04 PROCEDURE — 84484 ASSAY OF TROPONIN QUANT: CPT

## 2024-07-04 PROCEDURE — 700111 HCHG RX REV CODE 636 W/ 250 OVERRIDE (IP): Performed by: INTERNAL MEDICINE

## 2024-07-04 RX ORDER — SODIUM CHLORIDE 9 MG/ML
INJECTION, SOLUTION INTRAVENOUS CONTINUOUS
Status: DISCONTINUED | OUTPATIENT
Start: 2024-07-05 | End: 2024-07-06

## 2024-07-04 RX ADMIN — LEVETIRACETAM 1000 MG: 500 TABLET, FILM COATED ORAL at 16:37

## 2024-07-04 RX ADMIN — LEVETIRACETAM 1000 MG: 500 TABLET, FILM COATED ORAL at 05:20

## 2024-07-04 RX ADMIN — METOPROLOL TARTRATE 12.5 MG: 25 TABLET, FILM COATED ORAL at 05:20

## 2024-07-04 RX ADMIN — METOPROLOL TARTRATE 12.5 MG: 25 TABLET, FILM COATED ORAL at 16:37

## 2024-07-04 RX ADMIN — ACETAMINOPHEN 650 MG: 325 TABLET, FILM COATED ORAL at 20:46

## 2024-07-04 RX ADMIN — HUMAN ALBUMIN MICROSPHERES AND PERFLUTREN 3 ML: 10; .22 INJECTION, SOLUTION INTRAVENOUS at 10:04

## 2024-07-04 RX ADMIN — HEPARIN SODIUM 12 UNITS/KG/HR: 5000 INJECTION, SOLUTION INTRAVENOUS at 16:45

## 2024-07-04 RX ADMIN — ASPIRIN 81 MG: 81 TABLET, COATED ORAL at 05:20

## 2024-07-04 ASSESSMENT — PAIN DESCRIPTION - PAIN TYPE
TYPE: ACUTE PAIN
TYPE: ACUTE PAIN

## 2024-07-04 ASSESSMENT — ENCOUNTER SYMPTOMS
WHEEZING: 0
CHOKING: 0
APNEA: 0
STRIDOR: 0
COUGH: 0
CHEST TIGHTNESS: 0
SHORTNESS OF BREATH: 0

## 2024-07-04 ASSESSMENT — PATIENT HEALTH QUESTIONNAIRE - PHQ9
1. LITTLE INTEREST OR PLEASURE IN DOING THINGS: NOT AT ALL
2. FEELING DOWN, DEPRESSED, IRRITABLE, OR HOPELESS: NOT AT ALL
SUM OF ALL RESPONSES TO PHQ9 QUESTIONS 1 AND 2: 0

## 2024-07-04 ASSESSMENT — FIBROSIS 4 INDEX: FIB4 SCORE: 0.85

## 2024-07-04 NOTE — ED TRIAGE NOTES
Chief Complaint   Patient presents with    Chest Pain    Sent by MD     Transferred from [facility] for NonSTEMI. Aspirin and Lovenox given at transferring facility.      Cardiac, spO2 and bp monitor initiated.

## 2024-07-04 NOTE — CARE PLAN
The patient is Watcher - Medium risk of patient condition declining or worsening    Shift Goals  Clinical Goals: Maintain heparin infusion, Monitor Xa, pain level, labs. Rest  Patient Goals: Pain control/rest  Family Goals: Recovery (Mother at bedside.)    Progress made toward(s) clinical / shift goals:      Problem: Knowledge Deficit - Standard  Goal: Patient and family/care givers will demonstrate understanding of plan of care, disease process/condition, diagnostic tests and medications  Description: Target End Date:  1-3 days or as soon as patient condition allows    Document in Patient Education    1.  Patient and family/caregiver oriented to unit, equipment, visitation policy and means for communicating concern  2.  Complete/review Learning Assessment  3.  Assess knowledge level of disease process/condition, treatment plan, diagnostic tests and medications  4.  Explain disease process/condition, treatment plan, diagnostic tests and medications  Outcome: Progressing  Note: Encouraged Pt to perform oral care after evening meal to help protect himself from developing a respiratory infection.     21:04 Xa 0.52 Therapeutic. No changes just verification.  No report of pain during assessment Pt sleeping with ventilation noted.         Patient is not progressing towards the following goals:

## 2024-07-04 NOTE — ED NOTES
Med Rec complete per patient, S/O and RX bottle at bedside   Allergies reviewed  Antibiotics in the past 30 days:no  Anticoagulant in past 14 days:no  Pharmacy patient utilizes:[facility] in [place]

## 2024-07-04 NOTE — NON-PROVIDER
"  [facility] INTERNAL MEDICINE PROGRESS NOTE   Medical Student Note    Attending: Skip Shah M.D.  Senior Resident: Abram Orosco D.O. (PGY-3)  Medical Student: Arina Hallman, MS3  PATIENT: Esteban Guy; [id number]; 1995    Hospital Day # Hospital Day: 2    ID: 28 y.o. male with past medical history of epilepsy was admitted on 7/3/2024   for chest pain.     24 Hour Events: No acute events overnight     SUBJECTIVE: Mr. Guy was transferred from [facility] for further evaluation of an elevated troponin level. Mr. Guy told me he experienced two episodes of chest pain with the first taking place three days ago while he was eating breakfast around 8-9am. He describes the pain as a \"pressure\" that extended into his throat and proceeded down his left arm. Pain was 4/10 in severity accompanied with SOB. Prior to the the onset of his first episode he experienced a \"severe headache\" the day prior. States tylenol did not help with symptom relief. He notes the chest pain subsided around 12pm that day.     On 7/3 Mr. Guy experienced the same chest pain he had two days prior but this time categorized it as 6/10 in severity. The pain began at the same time at which point he told his mother about the pain who then brought him to the hospital. He notes prior to his transfer to [facility] his pain subsided. He mentioned fast food is a common part of his diet. When asked on average how often he consumes fast food within a week, he says he \"could not count\". Denies recent stress, sick contacts and consumption of energy drinks.     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 Denies the use of alcohol, tobacco and other illicit drugs. He states he use to smoke weed once a week but quit a year ago.      Family medical history: A maternal aunt with rheumatoid arthritis.     ROS:  Constitutional: Denies fever, chills, diaphoresis.  Cardio: Denies palpitations  Pulm: Shortness of breath.  Extremities: Denies numbness and " paraesthesias bilaterally.      Update Interval:  - USD was negative   - TTE shows normal anatomy and function.   - Coxsackie A and B panel ordered.  - BERT and rheumatoid factor were also ordered.      OBJECTIVE:  Temp:  [36 °C (96.8 °F)-36.6 °C (97.9 °F)] 36.5 °C (97.7 °F)  Pulse:  [64-76] 69  Resp:  [13-29] 16  BP: (113-141)/(64-89) 136/81  SpO2:  [95 %-98 %] 96 %    Intake/Output Summary (Last 24 hours) at 7/4/2024 0852  Last data filed at 7/4/2024 0400  Gross per 24 hour   Intake 460 ml   Output 0 ml   Net 460 ml       PE:  Gen: No acute distress, resting comfortably in bed  HEENT: normocephalic, atraumatic, EOMI  Pulm: clear to auscultation bilaterally, no respiratory distress   Cardio: RRR, no M/R/G. Costochondral region was non tender. Carotid arteries clear to auscultation. Radial pulses present. Capillary refill was less that 2 seconds.   Abdom: soft, nontender, nondistended, normoactive bowel sounds in all quadrants  Ext: No edema, 2+ pulses bilaterally  Neuro: Grossly intact    LABS:  Recent Labs     07/03/24 1728 07/03/24 2346   WBC 6.9 8.1   RBC 5.62 5.36   HEMOGLOBIN 15.4 14.7   HEMATOCRIT 46.4 44.9   MCV 82.6 83.8   MCH 27.4 27.4   RDW 41.0 42.0   PLATELETCT 204 206   MPV 10.4 10.4   NEUTSPOLYS 39.70*  --    LYMPHOCYTES 45.00*  --    MONOCYTES 12.80  --    EOSINOPHILS 1.60  --    BASOPHILS 0.60  --      Recent Labs     07/03/24  1728 07/03/24  2346   SODIUM 143 142   POTASSIUM 4.1 3.8   CHLORIDE 108 107   CO2 22 24   BUN 18 17   CREATININE 0.57 0.63   CALCIUM 9.3 9.1   ALBUMIN 4.4  --      Estimated GFR/CRCL = Estimated Creatinine Clearance: 245.3 mL/min (by C-G formula based on SCr of 0.63 mg/dL).  Recent Labs     07/03/24 1728 07/03/24  2346   GLUCOSE 95 95     Recent Labs     07/03/24  1728 07/03/24  2104   ASTSGOT 35  --    ALTSGPT 27  --    TBILIRUBIN 0.6  --    ALKPHOSPHAT 64  --    GLOBULIN 2.9  --    INR 1.04 1.08             Recent Labs     07/03/24  1728 07/03/24  2104   INR 1.04 1.08    APTT 32.1  --        MICROBIOLOGY:   Blood Culture   Date Value Ref Range Status   01/21/2014 No growth after 5 days of incubation.  Final        IMAGING:   EC-ECHOCARDIOGRAM COMPLETE W/O CONT    (Results Pending)       MEDS:  Current Facility-Administered Medications   Medication Last Admin    heparin infusion 25,000 units in 500 mL 0.45% NACL 12 Units/kg/hr at 07/04/24 0720    heparin injection 2,000 Units      Respiratory Therapy Consult      acetaminophen (Tylenol) tablet 650 mg      aspirin EC tablet 81 mg 81 mg at 07/04/24 0520    hydrALAZINE (Apresoline) injection 10 mg      nitroglycerin (Nitrostat) tablet 0.4 mg      morphine 4 MG/ML injection 2-4 mg      metoprolol tartrate (Lopressor) tablet 12.5 mg 12.5 mg at 07/04/24 0520    levETIRAcetam (Keppra) tablet 1,000 mg 1,000 mg at 07/04/24 0520       PROBLEM LIST:  Epilepsy - managed with Keppra     ASSESSMENT/PLAN: 28 y.o. male with past medical history of epilepsy was admitted for chest pain and being managed for NSTEMI.     #NSTEMI  - TTE shows normal anatomy and function with an EF of 55%. Cardiology plans to take him to the cath lab tomorrow.       Arina Hallman, MS3  [employer]

## 2024-07-04 NOTE — ASSESSMENT & PLAN NOTE
Patient will be admitted to the telemetry unit with close cardiac monitoring, serial EKG and troponin  Differential includes ACS, coronary vasospasm, myocarditis.   Patient has been given full dose of aspirin and is started on IV heparin, monitor Xa  Nitro and morphine when necessary for chest pain  Workup for possible underlying etiology:   - Ordered Coxsackie panel.   -Ordered BERT reflexive panel.  Rheumatoid factor negative.   Based on coronary angiography findings possible vasospastic etiology suspected.  Started 2.5 Mg daily amlodipine.  Ordered cardiac MRI.  Consulted cardiology:    Continue aspirin 81 mg daily.  -On low-dose metoprolol 12.5 twice daily.

## 2024-07-04 NOTE — CARE PLAN
The patient is Stable - Low risk of patient condition declining or worsening    Shift Goals  Clinical Goals: Heparin gtt, labs, cards consult  Patient Goals: Comfort, answers  Family Goals: recovery    Progress made toward(s) clinical / shift goals:    Problem: Hemodynamics  Goal: Patient's hemodynamics, fluid balance and neurologic status will be stable or improve  Description: Target End Date:  Prior to discharge or change in level of care    Document on Assessment and I/O flowsheet templates    1.  Monitor vital signs, pulse oximetry and cardiac monitor per provider order and/or policy  2.  Maintain blood pressure per provider order  3.  Hemodynamic monitoring per provider order  4.  Manage IV fluids and IV infusions  5.  Monitor intake and output  6.  Daily weights per unit policy or provider order  7.  Assess peripheral pulses and capillary refill  8.  Assess color and body temperature  9.  Position patient for maximum circulation/cardiac output  10. Monitor for signs/symptoms of excessive bleeding  11. Assess mental status, restlessness and changes in level of consciousness  12. Monitor temperature and report fever or hypothermia to provider immediately. Consideration of targeted temperature management.  Outcome: Progressing  Note: Pt remained hemodynamically stable evidenced by stable vital sings, proper urine output and adequate fluid intake.       Problem: Urinary Elimination  Goal: Establish and maintain regular urinary output  Description: Target End Date:  Prior to discharge or change in level of care    Document on I/O and Assessment flowsheets    1.  Evaluate need to continue indwelling catheter every shift  2.  Assess signs and symptoms of urinary retention  3.  Assess post-void residual volumes  4.  Implement bladder training program  5.  Encourage scheduled voidings  6.  Assist patient to sit on bedside commode or toilet for voiding  7.  Educate patient and family/caregiver on use and purpose of  urine collection devices (document in Patient Education)  Outcome: Progressing  Note: Patient able to eliminate effectively. UDS collected and sent to lab.

## 2024-07-04 NOTE — ED PROVIDER NOTES
ED Provider Note    CHIEF COMPLAINT  Chief Complaint   Patient presents with    Chest Pain    Sent by MD     Transferred from [facility] for NonSTEMI. Aspirin and Lovenox given at transferring facility.       EXTERNAL RECORDS REVIEWED  External ED Note outside hospital, troponin elevated at 120 also reviewed notes from neurology clinic 2023 partial symptomatic epilepsy    HPI/ROS  LIMITATION TO HISTORY   Select: : None  OUTSIDE HISTORIAN(S):  EMS      Esteban Guy Jr. is a 28 y.o. male who presents to the ED with chest pain.  The patient has been having intermittent episodes of chest pain over the last couple of days, this morning lasted for a few hours, central chest, rating up into his neck.  To where he went to the ER, once he was in the ER he was chest pain-free.  The patient's troponin came back elevated at 120 the patient was transferred here.  The patient denies any diabetes, hypertension, heart attacks, no family history of heart attacks, does not smoke.  The patient has not had any fevers runny nose cough congestion recently.  The pain when it was present was not exertional, no alleviation or aggravating factors.    PAST MEDICAL HISTORY   has a past medical history of Seizure (HCC).    SURGICAL HISTORY  patient denies any surgical history    FAMILY HISTORY  No family history on file.    SOCIAL HISTORY  Social History     Tobacco Use    Smoking status: Never    Smokeless tobacco: Never   Vaping Use    Vaping status: Some Days   Substance and Sexual Activity    Alcohol use: No    Drug use: No    Sexual activity: Not on file       CURRENT MEDICATIONS  Home Medications       Reviewed by Kimo Guerrero (Pharmacy Tech) on 07/03/24 at 1839  Med List Status: Complete     Medication Last Dose Status   levETIRAcetam (KEPPRA) 500 MG Tab 7/3/2024 Active                    ALLERGIES  No Known Allergies    PHYSICAL EXAM  VITAL SIGNS: /64   Pulse 72   Temp 36 °C (96.8 °F) (Temporal)   Resp 18   Ht  "1.803 m (5' 11\")   Wt (!) 140 kg (309 lb 8.4 oz)   SpO2 95%   BMI 43.17 kg/m²    Well-developed and nourished 28-year-old male who appears in mild distress  Atraumatic, normocephalic, oropharynx is clear  Neck is supple  Chest clear to auscultation  Regular rhythm  Abdomen soft nontender  Extremities no deformity seen    EKG/LABS  Results for orders placed or performed during the hospital encounter of 07/03/24   CBC w/ Differential   Result Value Ref Range    WBC 6.9 4.8 - 10.8 K/uL    RBC 5.62 4.70 - 6.10 M/uL    Hemoglobin 15.4 14.0 - 18.0 g/dL    Hematocrit 46.4 42.0 - 52.0 %    MCV 82.6 81.4 - 97.8 fL    MCH 27.4 27.0 - 33.0 pg    MCHC 33.2 32.3 - 36.5 g/dL    RDW 41.0 35.9 - 50.0 fL    Platelet Count 204 164 - 446 K/uL    MPV 10.4 9.0 - 12.9 fL    Neutrophils-Polys 39.70 (L) 44.00 - 72.00 %    Lymphocytes 45.00 (H) 22.00 - 41.00 %    Monocytes 12.80 0.00 - 13.40 %    Eosinophils 1.60 0.00 - 6.90 %    Basophils 0.60 0.00 - 1.80 %    Immature Granulocytes 0.30 0.00 - 0.90 %    Nucleated RBC 0.00 0.00 - 0.20 /100 WBC    Neutrophils (Absolute) 2.72 1.82 - 7.42 K/uL    Lymphs (Absolute) 3.08 1.00 - 4.80 K/uL    Monos (Absolute) 0.88 (H) 0.00 - 0.85 K/uL    Eos (Absolute) 0.11 0.00 - 0.51 K/uL    Baso (Absolute) 0.04 0.00 - 0.12 K/uL    Immature Granulocytes (abs) 0.02 0.00 - 0.11 K/uL    NRBC (Absolute) 0.00 K/uL   Complete Metabolic Panel (CMP)   Result Value Ref Range    Sodium 143 135 - 145 mmol/L    Potassium 4.1 3.6 - 5.5 mmol/L    Chloride 108 96 - 112 mmol/L    Co2 22 20 - 33 mmol/L    Anion Gap 13.0 7.0 - 16.0    Glucose 95 65 - 99 mg/dL    Bun 18 8 - 22 mg/dL    Creatinine 0.57 0.50 - 1.40 mg/dL    Calcium 9.3 8.5 - 10.5 mg/dL    Correct Calcium 9.0 8.5 - 10.5 mg/dL    AST(SGOT) 35 12 - 45 U/L    ALT(SGPT) 27 2 - 50 U/L    Alkaline Phosphatase 64 30 - 99 U/L    Total Bilirubin 0.6 0.1 - 1.5 mg/dL    Albumin 4.4 3.2 - 4.9 g/dL    Total Protein 7.3 6.0 - 8.2 g/dL    Globulin 2.9 1.9 - 3.5 g/dL    A-G Ratio " 1.5 g/dL   Troponin - STAT Once   Result Value Ref Range    Troponin T 277 (H) 6 - 19 ng/L   CRP QUANTITIVE (NON-CARDIAC)   Result Value Ref Range    Stat C-Reactive Protein 1.36 (H) 0.00 - 0.75 mg/dL   proBrain Natriuretic Peptide, NT   Result Value Ref Range    NT-proBNP 41 0 - 125 pg/mL   ESTIMATED GFR   Result Value Ref Range    GFR (CKD-EPI) 138 >60 mL/min/1.73 m 2   EKG   Result Value Ref Range    Report       [facility] Emergency Dept.    Test Date:  2024  Pt Name:    ALVINA GONZALEZ                 Department: ER  MRN:        [id number]                      Room:        03  Gender:     Male                         Technician: [id number]  :        1995                   Requested By:PARAG TOMAS  Order #:    [id number]                    Reading MD: PARAG TOMAS MD    Measurements  Intervals                                Axis  Rate:       69                           P:          7  ID:         [id number]                          QRS:        39  QRSD:       102                          T:          24  QT:         398  QTc:        427    Interpretive Statements  Sinus rhythm  ST elev, probable normal early repol pattern  Compared to ECG 2020 01:37:13  Sinus tachycardia no longer present  ST (T wave) deviation still present  Electronically Signed On 2024 18:01:33 PDT by PARAG TOMAS MD        I have independently interpreted this EKG    X-ray from outside facility shows no acute cardiopulmonary disease      COURSE & MEDICAL DECISION MAKING    ASSESSMENT, COURSE AND PLAN  Care Narrative: Patient is coming in transfer to outside facility secondary to elevated troponin, the patient has no chest pain now, EKG does not show any ST elevation, does not show any signs of pericarditis.  The patient's troponin is elevated I will start the patient on a heparin drip.  Will discuss case with the hospitalist for hospitalization.        DISPOSITION AND DISCUSSIONS  I have  discussed management of the patient with the following physicians and NARGIS's: Hospitalist    FINAL DIAGNOSIS  1. NSTEMI (non-ST elevated myocardial infarction) (HCC)           Electronically signed by: Allen Leigh M.D., 7/3/2024 5:18 PM

## 2024-07-04 NOTE — CONSULTS
Cardiology Initial Consultation    Date of Service  7/4/2024    Referring Physician  MATY Shah M.D.    Reason for Consultation    Elevated troponin    History of Presenting Illness  Esteban Guy Jr. is a 28 y.o. male with no past cardiac history, seizure disorder on Keppra who presented 7/3/2024 from [facility] for chest pain and elevated troponin.      No social bad habits, denies smoking, no alcohol, no drug abuse.  No family history of CAD.      Denies having recent cold/flu/cough/fever/URI recently.        Patient reports episode of chest pain 3 days ago , he was laying in bed playing with iPhone, described chest pain as heaviness radiating to his throat lasted couple of hours.  No alleviating or aggravating factors.  The second episode of chest pain with the same intensity and duration was yesterday that prompted him to come to ER for further evaluation.    Labs reviewed  Troponin peaked at 347.  UDS negative.    Echocardiogram reassuring LVEF 55% normal regional wall motion.      EKG sinus rhythm no evidence of acute ischemia      Review of Systems  Review of Systems   Respiratory:  Negative for apnea, cough, choking, chest tightness, shortness of breath, wheezing and stridor.        Past Medical History   has a past medical history of Seizure (HCC).  Seizure disorder  Surgical History   has no past surgical history on file.  No pertinent surgical hx  Family History  family history is not on file.  No prior family history of CAD  Social History   reports that he has never smoked. He has never used smokeless tobacco. He reports that he does not drink alcohol and does not use drugs.    Medications  Prior to Admission Medications   Prescriptions Last Dose Informant Patient Reported? Taking?   Acetaminophen (TYLENOL PO) COUPLE DAYS AGO at PRN Patient, Significant Other Yes Yes   Sig: Take 2 Tablets by mouth one time as needed (headache).   levETIRAcetam (KEPPRA) 500 MG Tab 7/3/2024 at AM Patient,  Significant Other, Rx Bottle (For Med Information) Yes Yes   Sig: Take 1,000 mg by mouth 2 times a day. 1,000 mg = 2 tabs      Facility-Administered Medications: None       Allergies  No Known Allergies    Vital signs in last 24 hours  Temp:  [36 °C (96.8 °F)-36.6 °C (97.9 °F)] 36.5 °C (97.7 °F)  Pulse:  [64-76] 69  Resp:  [13-29] 16  BP: (113-141)/(64-89) 136/81  SpO2:  [95 %-98 %] 96 %    Physical Exam  Physical Exam  Cardiovascular:      Rate and Rhythm: Normal rate and regular rhythm.      Pulses: Normal pulses.   Pulmonary:      Effort: Pulmonary effort is normal.   Skin:     General: Skin is warm.   Neurological:      Mental Status: He is alert. Mental status is at baseline.   Psychiatric:         Mood and Affect: Mood normal.         Lab Review  Lab Results   Component Value Date/Time    WBC 8.1 07/03/2024 11:46 PM    RBC 5.36 07/03/2024 11:46 PM    HEMOGLOBIN 14.7 07/03/2024 11:46 PM    HEMATOCRIT 44.9 07/03/2024 11:46 PM    MCV 83.8 07/03/2024 11:46 PM    MCH 27.4 07/03/2024 11:46 PM    MCHC 32.7 07/03/2024 11:46 PM    MPV 10.4 07/03/2024 11:46 PM      Lab Results   Component Value Date/Time    SODIUM 142 07/03/2024 11:46 PM    POTASSIUM 3.8 07/03/2024 11:46 PM    CHLORIDE 107 07/03/2024 11:46 PM    CO2 24 07/03/2024 11:46 PM    GLUCOSE 95 07/03/2024 11:46 PM    BUN 17 07/03/2024 11:46 PM    CREATININE 0.63 07/03/2024 11:46 PM      Lab Results   Component Value Date/Time    ASTSGOT 35 07/03/2024 05:28 PM    ALTSGPT 27 07/03/2024 05:28 PM     Lab Results   Component Value Date/Time    CHOLSTRLTOT 159 07/03/2024 11:46 PM     (H) 07/03/2024 11:46 PM    HDL 31 (A) 07/03/2024 11:46 PM    TRIGLYCERIDE 122 07/03/2024 11:46 PM    TROPONINT 282 (H) 07/04/2024 06:32 AM       Recent Labs     07/03/24  1728   NTPROBNP 41       Cardiac Imaging and Procedures Review  EKG:  SR    Echocardiogram:    Normal left ventricular size, wall thickness, and systolic function.   Normal diastolic function.  The right ventricle  is normal in size and systolic function.  Normal left atrial size.  No pericardial effusion.  No prior study is available for comparison.   LVEF 55%    Cardiac Catheterization:    Pending    Imaging  Chest X-Ray: pending      Assessment/Plan    # Chest pain  # Elevated troponin, ~340 flat  # Obesity, BMI 40  # Seizure disorder, on Keppra  # LVEF 55%    -Currently chest pain-free on IV heparin.  -Continue aspirin 81 mg daily.  -On low-dose metoprolol 12.5 twice daily.    -Keep n.p.o.  -Plan for coronary angiography 7/5/2024.    Cardiology will continue to follow.    I personally spent a total of 30 minutes which includes face-to-face time and non-face-to-face time spent on preparing to see the patient, reviewing hospital notes and tests, obtaining history from the patient, performing a medically appropriate exam, counseling and educating the patient, ordering medications/tests/procedures/referrals as clinically indicated, and documenting information in the electronic medical record.     Thank you for allowing me to participate in the care of this patient.        Please contact me with any questions.    MILA Renteria.   Cardiologist, [facility]  [phone]

## 2024-07-04 NOTE — DIETARY
NUTRITION SERVICES: BMI - Pt with BMI >40 (=Body mass index is 40.31 kg/m².), Class III obesity. Weight loss counseling not appropriate in acute care setting. RECOMMEND - If appropriate at DC please refer to outpatient nutrition services for weight management.      RD available PRN per department policy.

## 2024-07-04 NOTE — H&P
Hospital Medicine History & Physical Note    Date of Service  7/3/2024    Primary Care Physician  Pcp Pt States None    Consultants  None    Code Status  Full Code    Chief Complaint  Chief Complaint   Patient presents with    Chest Pain    Sent by MD     Transferred from [facility] for NonSTEMI. Aspirin and Lovenox given at transferring facility.       History of Presenting Illness  Esteban Guy Jr. is a 28 y.o. male with a past medical history of seizure disorder on Keppra who presented 7/3/2024 with chest pain that started yesterday.  Patient states that he was sleeping when he suddenly developed central chest pain with radiation down his left arm and up his neck.  It was associated with shortness of breath.  His symptoms lasted several hours and he decided to rest with some improvement.  He denied any fevers, chills or cough.  His symptoms reoccurred today and he decided to present to [facility] ER where he was noted to have an elevated troponin of 120 and was transferred to [facility] for further management.  Patient was given a full dose of aspirin and Lovenox prior to transfer.  Patient denies any history of smoking, alcohol abuse or drug abuse.  He denies any family history of MI, stroke or congestive heart failure.  He denies any new medications.  He denies any previous history of blood clots or any recent surgery.  At this time the patient is chest pain-free.    EKG interpreted by me reveals sinus rhythm with no ST depression.  Does not meet STEMI criteria  Chest x-ray from the outlying facility showed no acute cardiopulmonary process    I discussed the plan of care with patient, family, bedside RN, and ERP .    Review of Systems  Review of Systems   Respiratory:  Positive for shortness of breath.    Cardiovascular:  Positive for chest pain.       Past Medical History   has a past medical history of Seizure (HCC).    Surgical History  No pertinent surgical history    Family History     Family  history reviewed with patient. There is no family history that is pertinent to the chief complaint.     Social History   reports that he has never smoked. He has never used smokeless tobacco. He reports that he does not drink alcohol and does not use drugs.    Allergies  No Known Allergies    Medications  Prior to Admission Medications   Prescriptions Last Dose Informant Patient Reported? Taking?   Acetaminophen (TYLENOL PO) COUPLE DAYS AGO at PRN Patient, Significant Other Yes Yes   Sig: Take 2 Tablets by mouth one time as needed (headache).   levETIRAcetam (KEPPRA) 500 MG Tab 7/3/2024 at AM Patient, Significant Other, Rx Bottle (For Med Information) Yes Yes   Sig: Take 1,000 mg by mouth 2 times a day. 1,000 mg = 2 tabs      Facility-Administered Medications: None       Physical Exam  Temp:  [36 °C (96.8 °F)] 36 °C (96.8 °F)  Pulse:  [69-74] 69  Resp:  [13-29] 13  BP: (121-134)/(64) 121/64  SpO2:  [95 %-98 %] 96 %  Blood Pressure: 121/64   Temperature: 36 °C (96.8 °F)   Pulse: 69   Respiration: 13   Pulse Oximetry: 96 %       Physical Exam    Laboratory:  Recent Labs     07/03/24  1728   WBC 6.9   RBC 5.62   HEMOGLOBIN 15.4   HEMATOCRIT 46.4   MCV 82.6   MCH 27.4   MCHC 33.2   RDW 41.0   PLATELETCT 204   MPV 10.4     Recent Labs     07/03/24  1728   SODIUM 143   POTASSIUM 4.1   CHLORIDE 108   CO2 22   GLUCOSE 95   BUN 18   CREATININE 0.57   CALCIUM 9.3     Recent Labs     07/03/24  1728   ALTSGPT 27   ASTSGOT 35   ALKPHOSPHAT 64   TBILIRUBIN 0.6   GLUCOSE 95     Recent Labs     07/03/24  1728   APTT 32.1   INR 1.04     Recent Labs     07/03/24  1728   NTPROBNP 41         Recent Labs     07/03/24  1728   TROPONINT 277*       Imaging:  EC-ECHOCARDIOGRAM COMPLETE W/O CONT    (Results Pending)         Assessment/Plan:  Justification for Admission Status  I anticipate this patient will require at least two midnights for appropriate medical management, necessitating inpatient admission because NSTEMI    Patient will need  a Telemetry bed on MEDICAL service .  The need is secondary to NSTEMI.    * NSTEMI (non-ST elevated myocardial infarction) (HCC)- (present on admission)  Assessment & Plan  Patient will be admitted to the telemetry unit with close cardiac monitoring, serial EKG and troponin  Differential includes ACS, coronary vasospasm, myocarditis, pericarditis  Patient has been given full dose of aspirin and is started on IV heparin, monitor Xa  I will start the patient on metoprolol 12.5 mg twice daily   Check 2D echo, lipid panel, TSH and hemoglobin A1c  Nitro and morphine when necessary for chest pain  If patient has ongoing chest pain we will consult cardiology emergently otherwise will plan to consult them in the morning      Obesity (BMI 30-39.9)- (present on admission)  Assessment & Plan  Body mass index is 40 kg/m².  Recommended outpatient weight management program       Seizure disorder (HCC)- (present on admission)  Assessment & Plan  Continue Keppra        VTE prophylaxis: IV heparin    This patient is critically ill with NSTEMI. I am treating the patient with IV heparin and monitoring Xa levels and for bleeding complications.  I am monitoring serial serial EKG and troponin.  I have assessed and reassessed his respiratory status, blood pressure, hemodynamics, cardiovascular status.  He is at increased risk for worsening respiratory and cardiovascular system dysfunction.     High risk of deterioration and worsening vital organ dysfunction and death without the above critical care interventions.     Critical Care Time: 40 minutes  13822  No time overlap  Time excludes procedures  Discussed with RN

## 2024-07-04 NOTE — PROGRESS NOTES
Received report from James PATTERSON. Tele monitor in place. RA. Pt ambulated to bed from [facility].  No report of pain.

## 2024-07-04 NOTE — PROGRESS NOTES
[facility] Internal Medicine Daily Progress Note    Date of Service  7/4/2024    R Team: R IM Blue Team   Attending: MAYT Shah M.d.  Senior Resident: Dr. Lisa Valverde   Intern:  Dr. Shahram Acosta  Contact Number: [phone]    Chief Complaint  Esteban Guy Jr. is a 28 y.o. male admitted 7/3/2024 with Chest pain and NSTEMI.    Hospital Course  Esteban Guy Jr. is a 28 y.o. male with a past medical history of seizure disorder on Keppra who presented 7/3/2024 with chest pain. On 7/2 patient states that he was sleeping when he suddenly developed central chest pain with radiation down his left arm and up his neck. It was associated with shortness of breath. His symptoms lasted several hours and mildly improved with rest. He denied any fevers, chills or cough.      His symptoms reoccurred on 7/3 and he decided to present to [facility] where he was noted to have an elevated troponin of 120 and was transferred to [facility] for further management.  Patient was given a full dose of aspirin and Lovenox prior to transfer.  Patient denies any history of smoking, alcohol abuse or drug abuse.  He denies any family history of MI, stroke or congestive heart failure. He denies any new medications.  He denies any previous history of blood clots or any recent surgery.  ER the patient is chest pain-free.     Chest x-ray from the outlying facility showed no acute cardiopulmonary process.  No pertinent findings on ECHO.     Interval Problem Update  Troponin T came down from 347-2 82. Continuing to trend troponin.   Workup for possible underlying etiology:   - Ordered Coxsackie panel.   -Ordered BERT reflexive panel.  Rheumatoid factor negative.     Consulted cardiology:    Continue aspirin 81 mg daily.  -On low-dose metoprolol 12.5 twice daily.    -Keep n.p.o. -Plan for coronary angiography 7/5/2024.    I have discussed this patient's plan of care and discharge plan at IDT rounds today with Case Management,  Nursing, Nursing leadership, and other members of the IDT team.    Consultants/Specialty  cardiology    Code Status  Full Code    Disposition  The patient is not medically cleared for discharge to home or a post-acute facility.      I have placed the appropriate orders for post-discharge needs.    Review of Systems  ROS     Physical Exam  Temp:  [36 °C (96.8 °F)-36.6 °C (97.9 °F)] 36.5 °C (97.7 °F)  Pulse:  [64-76] 69  Resp:  [13-29] 16  BP: (113-141)/(64-89) 136/81  SpO2:  [95 %-98 %] 96 %    Physical Exam    Fluids    Intake/Output Summary (Last 24 hours) at 7/4/2024 1339  Last data filed at 7/4/2024 0800  Gross per 24 hour   Intake 460 ml   Output 700 ml   Net -240 ml       Laboratory  Recent Labs     07/03/24  1728 07/03/24  2346   WBC 6.9 8.1   RBC 5.62 5.36   HEMOGLOBIN 15.4 14.7   HEMATOCRIT 46.4 44.9   MCV 82.6 83.8   MCH 27.4 27.4   MCHC 33.2 32.7   RDW 41.0 42.0   PLATELETCT 204 206   MPV 10.4 10.4     Recent Labs     07/03/24  1728 07/03/24  2346   SODIUM 143 142   POTASSIUM 4.1 3.8   CHLORIDE 108 107   CO2 22 24   GLUCOSE 95 95   BUN 18 17   CREATININE 0.57 0.63   CALCIUM 9.3 9.1     Recent Labs     07/03/24  1728 07/03/24  2104   APTT 32.1  --    INR 1.04 1.08         Recent Labs     07/03/24  2346   TRIGLYCERIDE 122   HDL 31*   *       Imaging  EC-ECHOCARDIOGRAM COMPLETE W/ CONT   Final Result      DX-CHEST-PORTABLE (1 VIEW)    (Results Pending)        Assessment/Plan  Problem Representation:    * NSTEMI (non-ST elevated myocardial infarction) (HCC)- (present on admission)  Assessment & Plan  Patient will be admitted to the telemetry unit with close cardiac monitoring, serial EKG and troponin  Differential includes ACS, coronary vasospasm, myocarditis.   Patient has been given full dose of aspirin and is started on IV heparin, monitor Xa  Check 2D echo, lipid panel, TSH and hemoglobin A1c  Nitro and morphine when necessary for chest pain    Workup for possible underlying etiology:   - Ordered  Coxsackie panel.   -Ordered BERT reflexive panel.  Rheumatoid factor negative.     Consulted cardiology:    Continue aspirin 81 mg daily.  -On low-dose metoprolol 12.5 twice daily.    -Keep n.p.o. -Plan for coronary angiography 7/5/2024.      Obesity (BMI 30-39.9)- (present on admission)  Assessment & Plan  Body mass index is 40 kg/m².  Recommended outpatient weight management program       Seizure disorder (HCC)- (present on admission)  Assessment & Plan  Continue Keppra         VTE prophylaxis: heparin ppx    I have performed a physical exam and reviewed and updated ROS and Plan today (7/4/2024). In review of yesterday's note (7/3/2024), there are no changes except as documented above.

## 2024-07-04 NOTE — PROGRESS NOTES
4 Eyes Skin Assessment Completed by YESENIA Nettles and YESENIA Wong.    Head WDL  Ears WDL  Nose WDL  Mouth WDL  Neck WDL  Breast/Chest WDL  Shoulder Blades WDL  Spine WDL  (R) Arm/Elbow/Hand WDL  (L) Arm/Elbow/Hand WDL  Abdomen Rash  Groin WDL  Scrotum/Coccyx/Buttocks WDL  (R) Leg WDL  (L) Leg WDL  (R) Heel/Foot/Toe WDL  (L) Heel/Foot/Toe WDL          Devices In Places Tele Box      Interventions In Place Pillows    Possible Skin Injury No    Pictures Uploaded Into Epic Yes  Wound Consult Placed Yes  RN Wound Prevention Protocol Ordered Yes

## 2024-07-05 ENCOUNTER — APPOINTMENT (OUTPATIENT)
Dept: CARDIOLOGY | Facility: MEDICAL CENTER | Age: 29
DRG: 281 | End: 2024-07-05
Attending: NURSE PRACTITIONER
Payer: MEDICAID

## 2024-07-05 LAB
ACT BLD: 159 SEC (ref 74–137)
ALBUMIN SERPL BCP-MCNC: 4.1 G/DL (ref 3.2–4.9)
ALBUMIN/GLOB SERPL: 1.6 G/DL
ALP SERPL-CCNC: 65 U/L (ref 30–99)
ALT SERPL-CCNC: 24 U/L (ref 2–50)
ANION GAP SERPL CALC-SCNC: 11 MMOL/L (ref 7–16)
AST SERPL-CCNC: 22 U/L (ref 12–45)
BILIRUB SERPL-MCNC: 0.6 MG/DL (ref 0.1–1.5)
BUN SERPL-MCNC: 15 MG/DL (ref 8–22)
CALCIUM ALBUM COR SERPL-MCNC: 9.3 MG/DL (ref 8.5–10.5)
CALCIUM SERPL-MCNC: 9.4 MG/DL (ref 8.5–10.5)
CHLORIDE SERPL-SCNC: 105 MMOL/L (ref 96–112)
CO2 SERPL-SCNC: 23 MMOL/L (ref 20–33)
CREAT SERPL-MCNC: 0.57 MG/DL (ref 0.5–1.4)
EST. AVERAGE GLUCOSE BLD GHB EST-MCNC: 117 MG/DL
GFR SERPLBLD CREATININE-BSD FMLA CKD-EPI: 138 ML/MIN/1.73 M 2
GLOBULIN SER CALC-MCNC: 2.5 G/DL (ref 1.9–3.5)
GLUCOSE SERPL-MCNC: 90 MG/DL (ref 65–99)
HBA1C MFR BLD: 5.7 % (ref 4–5.6)
POTASSIUM SERPL-SCNC: 3.7 MMOL/L (ref 3.6–5.5)
PROT SERPL-MCNC: 6.6 G/DL (ref 6–8.2)
SODIUM SERPL-SCNC: 139 MMOL/L (ref 135–145)
TSH SERPL DL<=0.005 MIU/L-ACNC: 3.64 UIU/ML (ref 0.38–5.33)
UFH PPP CHRO-ACNC: 0.19 IU/ML
UFH PPP CHRO-ACNC: 0.46 IU/ML

## 2024-07-05 PROCEDURE — 700111 HCHG RX REV CODE 636 W/ 250 OVERRIDE (IP): Mod: JZ

## 2024-07-05 PROCEDURE — A9270 NON-COVERED ITEM OR SERVICE: HCPCS

## 2024-07-05 PROCEDURE — 700102 HCHG RX REV CODE 250 W/ 637 OVERRIDE(OP)

## 2024-07-05 PROCEDURE — 36415 COLL VENOUS BLD VENIPUNCTURE: CPT

## 2024-07-05 PROCEDURE — 84443 ASSAY THYROID STIM HORMONE: CPT

## 2024-07-05 PROCEDURE — 93458 L HRT ARTERY/VENTRICLE ANGIO: CPT

## 2024-07-05 PROCEDURE — 80053 COMPREHEN METABOLIC PANEL: CPT

## 2024-07-05 PROCEDURE — 700101 HCHG RX REV CODE 250

## 2024-07-05 PROCEDURE — 770020 HCHG ROOM/CARE - TELE (206)

## 2024-07-05 PROCEDURE — 700105 HCHG RX REV CODE 258: Performed by: NURSE PRACTITIONER

## 2024-07-05 PROCEDURE — 85347 COAGULATION TIME ACTIVATED: CPT

## 2024-07-05 PROCEDURE — 99152 MOD SED SAME PHYS/QHP 5/>YRS: CPT | Performed by: INTERNAL MEDICINE

## 2024-07-05 PROCEDURE — 85520 HEPARIN ASSAY: CPT

## 2024-07-05 PROCEDURE — 700111 HCHG RX REV CODE 636 W/ 250 OVERRIDE (IP): Performed by: INTERNAL MEDICINE

## 2024-07-05 PROCEDURE — 83036 HEMOGLOBIN GLYCOSYLATED A1C: CPT

## 2024-07-05 PROCEDURE — 700117 HCHG RX CONTRAST REV CODE 255: Performed by: INTERNAL MEDICINE

## 2024-07-05 PROCEDURE — B2111ZZ FLUOROSCOPY OF MULTIPLE CORONARY ARTERIES USING LOW OSMOLAR CONTRAST: ICD-10-PCS | Performed by: INTERNAL MEDICINE

## 2024-07-05 PROCEDURE — 99233 SBSQ HOSP IP/OBS HIGH 50: CPT | Mod: GC | Performed by: HOSPITALIST

## 2024-07-05 PROCEDURE — 93458 L HRT ARTERY/VENTRICLE ANGIO: CPT | Mod: 26 | Performed by: INTERNAL MEDICINE

## 2024-07-05 PROCEDURE — 700102 HCHG RX REV CODE 250 W/ 637 OVERRIDE(OP): Performed by: INTERNAL MEDICINE

## 2024-07-05 PROCEDURE — 4A023N7 MEASUREMENT OF CARDIAC SAMPLING AND PRESSURE, LEFT HEART, PERCUTANEOUS APPROACH: ICD-10-PCS | Performed by: INTERNAL MEDICINE

## 2024-07-05 PROCEDURE — A9270 NON-COVERED ITEM OR SERVICE: HCPCS | Performed by: INTERNAL MEDICINE

## 2024-07-05 RX ORDER — MIDAZOLAM HYDROCHLORIDE 1 MG/ML
INJECTION INTRAMUSCULAR; INTRAVENOUS
Status: COMPLETED
Start: 2024-07-05 | End: 2024-07-05

## 2024-07-05 RX ORDER — AMLODIPINE BESYLATE 2.5 MG/1
2.5 TABLET ORAL DAILY
Status: DISCONTINUED | OUTPATIENT
Start: 2024-07-05 | End: 2024-07-06 | Stop reason: HOSPADM

## 2024-07-05 RX ORDER — HEPARIN SODIUM 200 [USP'U]/100ML
INJECTION, SOLUTION INTRAVENOUS
Status: COMPLETED
Start: 2024-07-05 | End: 2024-07-05

## 2024-07-05 RX ORDER — HEPARIN SODIUM 1000 [USP'U]/ML
INJECTION, SOLUTION INTRAVENOUS; SUBCUTANEOUS
Status: COMPLETED
Start: 2024-07-05 | End: 2024-07-05

## 2024-07-05 RX ORDER — LIDOCAINE HYDROCHLORIDE 20 MG/ML
INJECTION, SOLUTION INFILTRATION; PERINEURAL
Status: COMPLETED
Start: 2024-07-05 | End: 2024-07-05

## 2024-07-05 RX ORDER — VERAPAMIL HYDROCHLORIDE 2.5 MG/ML
INJECTION, SOLUTION INTRAVENOUS
Status: COMPLETED
Start: 2024-07-05 | End: 2024-07-05

## 2024-07-05 RX ADMIN — LEVETIRACETAM 1000 MG: 500 TABLET, FILM COATED ORAL at 17:26

## 2024-07-05 RX ADMIN — METOPROLOL TARTRATE 12.5 MG: 25 TABLET, FILM COATED ORAL at 17:26

## 2024-07-05 RX ADMIN — SODIUM CHLORIDE: 9 INJECTION, SOLUTION INTRAVENOUS at 05:43

## 2024-07-05 RX ADMIN — NITROGLYCERIN 10 ML: 20 INJECTION INTRAVENOUS at 09:54

## 2024-07-05 RX ADMIN — HEPARIN SODIUM 2000 UNITS: 200 INJECTION, SOLUTION INTRAVENOUS at 09:56

## 2024-07-05 RX ADMIN — METOPROLOL TARTRATE 12.5 MG: 25 TABLET, FILM COATED ORAL at 05:40

## 2024-07-05 RX ADMIN — LEVETIRACETAM 1000 MG: 500 TABLET, FILM COATED ORAL at 05:39

## 2024-07-05 RX ADMIN — MIDAZOLAM HYDROCHLORIDE 2 MG: 1 INJECTION, SOLUTION INTRAMUSCULAR; INTRAVENOUS at 09:57

## 2024-07-05 RX ADMIN — IOHEXOL 45 ML: 350 INJECTION, SOLUTION INTRAVENOUS at 10:12

## 2024-07-05 RX ADMIN — LIDOCAINE HYDROCHLORIDE: 20 INJECTION, SOLUTION INFILTRATION; PERINEURAL at 09:54

## 2024-07-05 RX ADMIN — AMLODIPINE BESYLATE 2.5 MG: 2.5 TABLET ORAL at 15:23

## 2024-07-05 RX ADMIN — HEPARIN SODIUM 2000 UNITS: 1000 INJECTION, SOLUTION INTRAVENOUS; SUBCUTANEOUS at 06:04

## 2024-07-05 RX ADMIN — HEPARIN SODIUM: 1000 INJECTION, SOLUTION INTRAVENOUS; SUBCUTANEOUS at 09:55

## 2024-07-05 RX ADMIN — SODIUM CHLORIDE: 9 INJECTION, SOLUTION INTRAVENOUS at 21:21

## 2024-07-05 RX ADMIN — FENTANYL CITRATE 75 MCG: 50 INJECTION, SOLUTION INTRAMUSCULAR; INTRAVENOUS at 10:12

## 2024-07-05 RX ADMIN — VERAPAMIL HYDROCHLORIDE 5 MG: 2.5 INJECTION, SOLUTION INTRAVENOUS at 09:54

## 2024-07-05 RX ADMIN — ASPIRIN 81 MG: 81 TABLET, COATED ORAL at 05:39

## 2024-07-05 RX ADMIN — MIDAZOLAM HYDROCHLORIDE 1 MG: 1 INJECTION, SOLUTION INTRAMUSCULAR; INTRAVENOUS at 10:13

## 2024-07-05 ASSESSMENT — ENCOUNTER SYMPTOMS
CHEST TIGHTNESS: 0
COUGH: 0
CONSTITUTIONAL NEGATIVE: 1
CLAUDICATION: 0
PSYCHIATRIC NEGATIVE: 1
STRIDOR: 0
ORTHOPNEA: 0
HEADACHES: 0
SHORTNESS OF BREATH: 0
GASTROINTESTINAL NEGATIVE: 1
PALPITATIONS: 0
CHOKING: 0
MUSCULOSKELETAL NEGATIVE: 1
APNEA: 0
WHEEZING: 0
EYES NEGATIVE: 1

## 2024-07-05 ASSESSMENT — FIBROSIS 4 INDEX: FIB4 SCORE: 0.85

## 2024-07-05 ASSESSMENT — PAIN DESCRIPTION - PAIN TYPE
TYPE: ACUTE PAIN
TYPE: ACUTE PAIN

## 2024-07-05 NOTE — PROGRESS NOTES
DOS 7/5/24  I saw and examined the patient and reviewed the case by the resident physician Dr. Acosta. I reviewed the resident's note and agree with the documented findings and plan of care except as noted in my comments below.     Additional attending comments:  29 yo male admitted with NSTEMI ----> now CP free. [facility] with clean coronaries but slow/low flow noted that improved with NTG ------> vasospasm +/- myocarditis.     On ASA and metoprolol. Will add low dose amlodipine and prn NTG. Cardiac MRI recommended by cardiology. Coxsackie titers pending.      55 minutes total time spent in records review, lab/radiology assessment, patient history and assessment, and directing plan of care with high level medical decision making complexity. See orders.     MATY Shah MD, FACP, [facility]  Attending Physician  Professor of Medicine & Academic Hospitalist

## 2024-07-05 NOTE — PROCEDURES
Cardiac Catheterization Laboratory Procedure Note    DATE: 7/5/2024    : Palomo Camargo MD, MPH    PROCEDURES PERFORMED:  Left heart catheterization  Coronary angiography  Moderate conscious sedation    INDICATIONS:  NSTEMI    CONSENT:  The complete alternatives, risks, and benefits of the procedure were explained to the patient. Informed consent was obtained prior to the procedure.    MEDICATIONS:  Lidocaine  Fentanyl  Midazolam  Nitroglycerin  Verapamil  Heparin    MODERATE CONSCIOUS SEDATION:  I personally supervised the administration of moderate conscious sedation by the nursing staff for 20 minutes.  Start time: 9:52 AM  End time: 10:12 AM    CONTRAST: Omnipaque 45 cc    RADIATION DOSE (Air Kerma): 284 mGy    FLUOROSCOPY TIME: 7.6 minutes    ACCESS:  6-Malagasy Glidesheath in the right radial artery    ESTIMATED BLOOD LOSS: <10 cc    COMPLICATIONS: None    PROCEDURE IN DETAIL:  The patient was brought to the cardiac catheterization laboratory in the fasting state. The skin over the right wrist was prepped and draped in the usual sterile fashion. Lidocaine infiltration was used to anesthetize the tissue over the right radial artery. Using the micropuncture technique, a 6-Malagasy Glidesheath was inserted in the right radial artery. A 5-Fr Terumo Tiger diagnostic catheter was then advanced over a standard J-wire into the aortic root and used to engage the right coronary artery and cineangiograms were obtained in multiple projections for complete evaluation of the right coronary system. This catheter was then exchanged to a 6 Malagasy JL 4 diagnostic catheter which was unable to engage the left coronary artery hence exchanged to a 5 Malagasy EBU 3.5 guide catheter which was able to engage the left coronary artery  and cineangiograms were obtained in multiple projections for complete evaluation of the left coronary system. Following completion of coronary angiography, all wires, catheters, and sheaths were removed.   A TR band was placed over the right wrist using the patent hemostasis technique.     HEMODYNAMICS:  Aortic pressure: 117 /83 mmHg  LVEDP: 20-25 mmHg  No significant aortic gradient on pullback    CORONARY ANGIOGRAPHY:  The left main coronary artery : Normal-appearing large caliber vessel that trifurcates to LAD, ramus intermedius and left circumflex  The left anterior descending coronary artery : Large-caliber normal-appearing transapical vessel with slow coronary flow.  Improved after administration of nitroglycerin.  Gives rise to a large diagonal branch  Ramus intermedius: Normal-appearing large caliber vessel  The left circumflex coronary artery :normal appearing large-caliber vessel that gives rise to diminutive OM1, large OM 2, large OM 3 and true circumflex tapers in the AV groove  The right coronary artery  : Large-caliber normal-appearing dominant vessel    IMPRESSION:  1.  Normal-appearing epicardial coronary arteries with mild slow coronary flow in the LAD which can reflect either underlying endothelial dysfunction/microvascular disease or due to elevated LVEDP  2.  Consider myocarditis as a differential for his presentation and elevated troponin.   3.  Elevated resting LVEDP 20-25 mmHg without significant transaortic gradient on pullback    RECOMMENDATIONS:  Consider cardiac MRI  Heparin drip can be stopped if indication is NSTEMI/ACS  TR band protocol  Ongoing ASCVD preventive measures    NOTIFICATION:  The patient's mother was updated in the waiting area    Referring Cardiologist - Dr. Schmitz - was notified.    Palomo Camargo MD, MPH [facility]  Interventional Cardiologist  [facility]   of Clinical Internal Medicine - [facility]

## 2024-07-05 NOTE — PROGRESS NOTES
Pt returned from Cath lab and report received from Driss. Site to R radial is a 14 and band is CDI.

## 2024-07-05 NOTE — NON-PROVIDER
"  [facility] INTERNAL MEDICINE PROGRESS NOTE   Medical Student Note    Attending: Skip Shah M.D.  Senior Resident: Abram Gonzalez D.O. (PGY-3)  Medical Student: Arina Hallman, MS3  PATIENT: Esteban Guy; [id number]; 1995    Hospital Day # Hospital Day: 3    ID: 28 y.o. male with past medical history of epilepsy was admitted on 7/3/2024 for 2 episodes of chest pain.     24 Hour Events: Patient was awake and alert this morning. He states last night he experienced an episode of chest pain that began at 8:00pm and lasted for 30-40 minutes. He describes the pain as being in the center of his chest that radiated to his left arm. Rates severity of pain a 2/10.     SUBJECTIVE: Mr. Guy was transferred from [facility] for further evaluation of an elevated troponin level. Mr. uGy told me he experienced two episodes of chest pain with the first taking place three days ago while he was eating breakfast around 8-9am. He describes the pain as a \"pressure\" that extended into his throat and proceeded down his left arm. Pain was 4/10 in severity accompanied with SOB. Prior to the onset of his first episode he experienced a \"severe headache\" the day prior. States tylenol did not help with symptom relief. He notes the chest pain subsided around 12pm that day.     On 7/3 Mr. Guy experienced the same chest pain he had two days prior but this time categorized it as 6/10 in severity. The pain began at the same time at which point he told his mother about the pain who then brought him to the hospital. He notes prior to his transfer to [facility] his pain subsided. He mentioned fast food is a common part of his diet. When asked on average how often he consumes fast food within a week, he says he \"could not count\". Denies recent stress, sick contacts and consumption of energy drinks.     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 Denies the use of alcohol, tobacco and other illicit drugs. He states he use to smoke weed " once a week but quit a year ago.      Family medical history: A maternal aunt with rheumatoid arthritis.     ROS:  Constitutional: Denies fevers and chills.   Cardio: Chest pain last night at 8:00pm.   Pulm: Denies SOB  Extremities: Pain in his left arm during chest pain episode.    Update Interval:  On 7/4/2024 USD was negative TTE shows normal anatomy and function.   Coxsackie A and B panel is pending.     Today, BERT and rheumatoid factor were unremarkable. TSH was unremarkable. Patient's cath showed mild slowing in coronary flow of the LAD and elevated resting LVEDP 20-25mmHg. Heparin drip was discontinued. 2.5mg of Amlodipine and PRN nitroglycerin 0.4mg.     OBJECTIVE:  Temp:  [36.3 °C (97.3 °F)-36.6 °C (97.9 °F)] 36.3 °C (97.3 °F)  Pulse:  [60-75] 63  Resp:  [16-18] 18  BP: (108-139)/(60-85) 108/71  SpO2:  [92 %-99 %] 99 %    Intake/Output Summary (Last 24 hours) at 7/4/2024 0852  Last data filed at 7/4/2024 0400  Gross per 24 hour   Intake 460 ml   Output 0 ml   Net 460 ml       PE:  Gen: No acute distress, resting comfortably in bed  HEENT: normocephalic, atraumatic  Pulm: clear to auscultation bilaterally, no respiratory distress   Cardio: RRR, no M/R/G.    Neuro: Grossly intact    LABS:  Recent Labs     07/03/24 1728 07/03/24  2346   WBC 6.9 8.1   RBC 5.62 5.36   HEMOGLOBIN 15.4 14.7   HEMATOCRIT 46.4 44.9   MCV 82.6 83.8   MCH 27.4 27.4   RDW 41.0 42.0   PLATELETCT 204 206   MPV 10.4 10.4   NEUTSPOLYS 39.70*  --    LYMPHOCYTES 45.00*  --    MONOCYTES 12.80  --    EOSINOPHILS 1.60  --    BASOPHILS 0.60  --      Recent Labs     07/03/24 1728 07/03/24  2346 07/05/24  0322   SODIUM 143 142 139   POTASSIUM 4.1 3.8 3.7   CHLORIDE 108 107 105   CO2 22 24 23   BUN 18 17 15   CREATININE 0.57 0.63 0.57   CALCIUM 9.3 9.1 9.4   ALBUMIN 4.4  --  4.1     Estimated GFR/CRCL = Estimated Creatinine Clearance: 271.1 mL/min (by C-G formula based on SCr of 0.57 mg/dL).  Recent Labs     07/03/24  1728 07/03/24  2139  07/05/24  0322   GLUCOSE 95 95 90     Recent Labs     07/03/24  1728 07/03/24  2104 07/05/24  0322   ASTSGOT 35  --  22   ALTSGPT 27  --  24   TBILIRUBIN 0.6  --  0.6   ALKPHOSPHAT 64  --  65   GLOBULIN 2.9  --  2.5   INR 1.04 1.08  --              Recent Labs     07/03/24  1728 07/03/24  2104   INR 1.04 1.08   APTT 32.1  --        MICROBIOLOGY:   Blood Culture   Date Value Ref Range Status   01/21/2014 No growth after 5 days of incubation.  Final        IMAGING:   DX-CHEST-PORTABLE (1 VIEW)   Final Result      No acute cardiac or pulmonary abnormalities are identified.      EC-ECHOCARDIOGRAM COMPLETE W/ CONT   Final Result      CL-LEFT HEART CATHETERIZATION WITH POSSIBLE INTERVENTION    (Results Pending)       MEDS:  Current Facility-Administered Medications   Medication Last Admin    NS infusion      lidocaine (Xylocaine) 1 % injection 0.5 mL      heparin infusion 25,000 units in 500 mL 0.45% NACL 12 Units/kg/hr at 07/04/24 1645    heparin injection 2,000 Units      Respiratory Therapy Consult      acetaminophen (Tylenol) tablet 650 mg 650 mg at 07/04/24 2046    aspirin EC tablet 81 mg 81 mg at 07/04/24 0520    hydrALAZINE (Apresoline) injection 10 mg      nitroglycerin (Nitrostat) tablet 0.4 mg      morphine 4 MG/ML injection 2-4 mg      metoprolol tartrate (Lopressor) tablet 12.5 mg 12.5 mg at 07/04/24 1637    levETIRAcetam (Keppra) tablet 1,000 mg 1,000 mg at 07/04/24 1637       PROBLEM LIST:  Epilepsy - managed with Keppra.     ASSESSMENT/PLAN: 28 y.o. male with past medical history of epilepsy was admitted for chest pain and being managed for NSTEMI.     #NSTEMI  Patient's cath revealed mild slow coronary flow in the LAD and elevated resting LVEDP of 20-25mmHg.  Ddx: NSTEMI secondary to myocarditis vs vasospastic angina   - Cardiac MRI was ordered  -2.5mg Amlodipine and 0.4mg Alendronate were added.  - Heparin was discontinued.     #Prediabetes  Hemoglobin A1C was found to be 5.7.  - Patient was counseled on  prediabetes standing and lifestyle modifications that can be taken to address prediabetes.      Arina Hallman, MS3  [facility]

## 2024-07-05 NOTE — PROGRESS NOTES
[facility] Internal Medicine Daily Progress Note    Date of Service  7/5/2024    [facility] Team: [facility] IM Blue Team   Attending: MATY Shah M.d.  Senior Resident: Dr. Lisa Valverde   Intern:  Dr. Shahram Acosta  Contact Number: [phone]    Chief Complaint  Esteban Guy Jr. is a 28 y.o. male admitted 7/3/2024 with Chest pain and NSTEMI.    Hospital Course  Esteban Guy Jr. is a 28 y.o. male with a past medical history of seizure disorder on Keppra who presented 7/3/2024 with chest pain. On 7/2 patient states that he was sleeping when he suddenly developed central chest pain with radiation down his left arm and up his neck. It was associated with shortness of breath. His symptoms lasted several hours and mildly improved with rest. He denied any fevers, chills or cough.      His symptoms reoccurred on 7/3 and he decided to present to [facility] where he was noted to have an elevated troponin of 120 and was transferred to [facility] for further management.  Patient was given a full dose of aspirin and Lovenox prior to transfer.  Patient denies any history of smoking, alcohol abuse or drug abuse.  He denies any family history of MI, stroke or congestive heart failure. He denies any new medications.  He denies any previous history of blood clots or any recent surgery.  ER the patient is chest pain-free. Chest x-ray from the outlying facility showed no acute cardiopulmonary process. No pertinent findings on ECHO.Troponin T came down from 347 to 282. Workup for possible underlying etiology: - Ordered Coxsackie panel. -Ordered BERT reflexive panel.  Rheumatoid factor negative.       Consulted cardiology:    Continue aspirin 81 mg daily.  -On low-dose metoprolol 12.5 twice daily.    -Keep n.p.o. -Plan for coronary angiography 7/5/2024.     Interval Problem Update  He reports mid sternal non radiating chest pain 2/10 around 8 pm yesterday evening. Relieved w tylenol.   No other complaints and not in acute distress.  As per coronary angiography performed on 7/5, suspecting vasospastic etiology. Ordered 2.5 Mg daily amlodipine.     I have discussed this patient's plan of care and discharge plan at IDT rounds today with Case Management, Nursing, Nursing leadership, and other members of the IDT team.    Consultants/Specialty  cardiology    Code Status  Full Code    Disposition  Medically Cleared  I have placed the appropriate orders for post-discharge needs.    Review of Systems  Review of Systems   Constitutional: Negative.    HENT: Negative.     Eyes: Negative.    Respiratory:  Negative for cough, shortness of breath and wheezing.    Cardiovascular:  Negative for chest pain, palpitations, orthopnea, claudication and leg swelling.   Gastrointestinal: Negative.    Genitourinary: Negative.    Musculoskeletal: Negative.    Skin: Negative.    Neurological:  Negative for headaches.   Endo/Heme/Allergies: Negative.    Psychiatric/Behavioral: Negative.          Physical Exam  Temp:  [36 °C (96.8 °F)-36.6 °C (97.9 °F)] 36 °C (96.8 °F)  Pulse:  [60-75] 66  Resp:  [16-18] 16  BP: (108-138)/(60-85) 115/73  SpO2:  [92 %-99 %] 95 %    Physical Exam  Constitutional:       Appearance: Normal appearance. He is normal weight.   HENT:      Head: Normocephalic and atraumatic.      Nose: Nose normal.   Eyes:      Extraocular Movements: Extraocular movements intact.      Pupils: Pupils are equal, round, and reactive to light.   Cardiovascular:      Rate and Rhythm: Normal rate and regular rhythm.      Pulses: Normal pulses.      Heart sounds: Normal heart sounds.   Pulmonary:      Effort: Pulmonary effort is normal.      Breath sounds: Normal breath sounds.   Abdominal:      General: Abdomen is flat. There is no distension.      Palpations: Abdomen is soft.      Tenderness: There is no abdominal tenderness.   Musculoskeletal:         General: Normal range of motion.      Cervical back: Normal range of motion.   Skin:     Capillary Refill: Capillary  refill takes less than 2 seconds.   Neurological:      General: No focal deficit present.      Mental Status: He is alert and oriented to person, place, and time. Mental status is at baseline.   Psychiatric:         Mood and Affect: Mood normal.         Behavior: Behavior normal.         Fluids    Intake/Output Summary (Last 24 hours) at 7/5/2024 1320  Last data filed at 7/5/2024 0400  Gross per 24 hour   Intake 360 ml   Output 1400 ml   Net -1040 ml       Laboratory  Recent Labs     07/03/24 1728 07/03/24  2346   WBC 6.9 8.1   RBC 5.62 5.36   HEMOGLOBIN 15.4 14.7   HEMATOCRIT 46.4 44.9   MCV 82.6 83.8   MCH 27.4 27.4   MCHC 33.2 32.7   RDW 41.0 42.0   PLATELETCT 204 206   MPV 10.4 10.4     Recent Labs     07/03/24  1728 07/03/24  2346 07/05/24  0322   SODIUM 143 142 139   POTASSIUM 4.1 3.8 3.7   CHLORIDE 108 107 105   CO2 22 24 23   GLUCOSE 95 95 90   BUN 18 17 15   CREATININE 0.57 0.63 0.57   CALCIUM 9.3 9.1 9.4     Recent Labs     07/03/24  1728 07/03/24  2104   APTT 32.1  --    INR 1.04 1.08         Recent Labs     07/03/24  2346   TRIGLYCERIDE 122   HDL 31*   *       Imaging  DX-CHEST-PORTABLE (1 VIEW)   Final Result      No acute cardiac or pulmonary abnormalities are identified.      EC-ECHOCARDIOGRAM COMPLETE W/ CONT   Final Result      CL-LEFT HEART CATHETERIZATION WITH POSSIBLE INTERVENTION    (Results Pending)   MR-CARDIAC MORPH/FUNC WITH & W/O    (Results Pending)        Assessment/Plan  Problem Representation:    * NSTEMI (non-ST elevated myocardial infarction) ([facility])- (present on admission)  Assessment & Plan  Patient will be admitted to the telemetry unit with close cardiac monitoring, serial EKG and troponin  Differential includes ACS, coronary vasospasm, myocarditis.   Patient has been given full dose of aspirin and is started on IV heparin, monitor Xa  Nitro and morphine when necessary for chest pain  Workup for possible underlying etiology:   - Ordered Coxsackie panel.   -Ordered BERT  reflexive panel.  Rheumatoid factor negative.   Based on coronary angiography findings possible vasospastic etiology suspected.  Started 2.5 Mg daily amlodipine.  Ordered cardiac MRI.  Consulted cardiology:    Continue aspirin 81 mg daily.  -On low-dose metoprolol 12.5 twice daily.        Obesity (BMI 30-39.9)- (present on admission)  Assessment & Plan  Body mass index is 40 kg/m².  Recommended outpatient weight management program       Seizure disorder (HCC)- (present on admission)  Assessment & Plan  Continue Keppra         VTE prophylaxis: heparin ppx    I have performed a physical exam and reviewed and updated ROS and Plan today (7/5/2024). In review of yesterday's note (7/4/2024), there are no changes except as documented above.

## 2024-07-05 NOTE — PROGRESS NOTES
Cardiology Initial Consultation    Date of Service  7/5/2024    Referring Physician  MATY Shah M.D.    Reason for Consultation    Elevated troponin ~340    History of Presenting Illness  Esteban Guy Jr. is a 28 y.o. male with no past cardiac history, seizure disorder on Keppra who presented 7/3/2024 from [facility] for chest pain and elevated troponin.      No social bad habits, denies smoking, no alcohol, no drug abuse.  No family history of CAD.      Denies having recent cold/flu/cough/fever/URI recently.          Labs reviewed  Troponin peaked at 347.  UDS negative.    Echocardiogram reassuring LVEF 55% normal regional wall motion.      EKG sinus rhythm no evidence of acute ischemia      Interim events    No overnight cardiac events  Telemetry-SR  Reports brief episode of chest discomfort overnight, currently chest pain-free  On IV heparin  Keep n.p.o.  Plan for LHC later today      Review of Systems  Review of Systems   Respiratory:  Negative for apnea, cough, choking, chest tightness, shortness of breath, wheezing and stridor.        Past Medical History   has a past medical history of Seizure ([facility]).  Seizure disorder  Surgical History   has no past surgical history on file.  No pertinent surgical hx  Family History  family history is not on file.  No prior family history of CAD  Social History   reports that he has never smoked. He has never used smokeless tobacco. He reports that he does not drink alcohol and does not use drugs.    Medications  Prior to Admission Medications   Prescriptions Last Dose Informant Patient Reported? Taking?   Acetaminophen (TYLENOL PO) COUPLE DAYS AGO at PRN Patient, Significant Other Yes Yes   Sig: Take 2 Tablets by mouth one time as needed (headache).   levETIRAcetam (KEPPRA) 500 MG Tab 7/3/2024 at AM Patient, Significant Other, Rx Bottle (For Med Information) Yes Yes   Sig: Take 1,000 mg by mouth 2 times a day. 1,000 mg = 2 tabs      Facility-Administered  Medications: None       Allergies  No Known Allergies    Vital signs in last 24 hours  Temp:  [36.3 °C (97.3 °F)-36.6 °C (97.9 °F)] 36.4 °C (97.5 °F)  Pulse:  [60-75] 64  Resp:  [16-18] 18  BP: (108-139)/(60-85) 122/81  SpO2:  [92 %-99 %] 95 %    Physical Exam  Physical Exam  Cardiovascular:      Rate and Rhythm: Normal rate and regular rhythm.      Pulses: Normal pulses.   Pulmonary:      Effort: Pulmonary effort is normal.   Skin:     General: Skin is warm.   Neurological:      Mental Status: He is alert. Mental status is at baseline.   Psychiatric:         Mood and Affect: Mood normal.         Lab Review  Lab Results   Component Value Date/Time    WBC 8.1 07/03/2024 11:46 PM    RBC 5.36 07/03/2024 11:46 PM    HEMOGLOBIN 14.7 07/03/2024 11:46 PM    HEMATOCRIT 44.9 07/03/2024 11:46 PM    MCV 83.8 07/03/2024 11:46 PM    MCH 27.4 07/03/2024 11:46 PM    MCHC 32.7 07/03/2024 11:46 PM    MPV 10.4 07/03/2024 11:46 PM      Lab Results   Component Value Date/Time    SODIUM 139 07/05/2024 03:22 AM    POTASSIUM 3.7 07/05/2024 03:22 AM    CHLORIDE 105 07/05/2024 03:22 AM    CO2 23 07/05/2024 03:22 AM    GLUCOSE 90 07/05/2024 03:22 AM    BUN 15 07/05/2024 03:22 AM    CREATININE 0.57 07/05/2024 03:22 AM      Lab Results   Component Value Date/Time    ASTSGOT 22 07/05/2024 03:22 AM    ALTSGPT 24 07/05/2024 03:22 AM     Lab Results   Component Value Date/Time    CHOLSTRLTOT 159 07/03/2024 11:46 PM     (H) 07/03/2024 11:46 PM    HDL 31 (A) 07/03/2024 11:46 PM    TRIGLYCERIDE 122 07/03/2024 11:46 PM    TROPONINT 232 (H) 07/04/2024 01:24 PM       Recent Labs     07/03/24  1728   NTPROBNP 41       Cardiac Imaging and Procedures Review  EKG:  SR    Echocardiogram:    Normal left ventricular size, wall thickness, and systolic function.   Normal diastolic function.  The right ventricle is normal in size and systolic function.  Normal left atrial size.  No pericardial effusion.  No prior study is available for comparison.   LVEF  55%    Cardiac Catheterization:    Pending    Imaging  Chest X-Ray: pending      Assessment/Plan    # Chest pain  # Elevated troponin, ~340 flat  # Obesity, BMI 40  # Seizure disorder, on Keppra  # LVEF 55%    -Currently chest pain-free on IV heparin.  -Continue aspirin 81 mg daily.  -On low-dose metoprolol 12.5 twice daily.    -Keep n.p.o.  -Plan for coronary angiography 7/5/2024 due to significant troponin peak & to further delineate coronary anatomy.    Cardiology will continue to follow.    I personally spent a total of 15 minutes which includes face-to-face time and non-face-to-face time spent on preparing to see the patient, reviewing hospital notes and tests, obtaining history from the patient, performing a medically appropriate exam, counseling and educating the patient, ordering medications/tests/procedures/referrals as clinically indicated, and documenting information in the electronic medical record.     Thank you for allowing me to participate in the care of this patient.        Please contact me with any questions.    MILA Renteria.   Cardiologist, [facility]  [phone]

## 2024-07-05 NOTE — CARE PLAN
The patient is Watcher - Medium risk of patient condition declining or worsening    Shift Goals  Clinical Goals: Monitor Labs, Heparin gtt, rest  Patient Goals: Rest  Family Goals: Return home    Progress made toward(s) clinical / shift goals:      Problem: Knowledge Deficit - Standard  Goal: Patient and family/care givers will demonstrate understanding of plan of care, disease process/condition, diagnostic tests and medications  Description: Target End Date:  1-3 days or as soon as patient condition allows    Document in Patient Education    1.  Patient and family/caregiver oriented to unit, equipment, visitation policy and means for communicating concern  2.  Complete/review Learning Assessment  3.  Assess knowledge level of disease process/condition, treatment plan, diagnostic tests and medications  4.  Explain disease process/condition, treatment plan, diagnostic tests and medications  Outcome: Progressing  Note: Pt understands that at midnight he becomes NPO in preparation for his appointment in the Cath Lab.  NS 50 mL/hr will be started at 6 a. M too.       Problem: Urinary Elimination  Goal: Establish and maintain regular urinary output  Description: Target End Date:  Prior to discharge or change in level of care    Document on I/O and Assessment flowsheets    1.  Evaluate need to continue indwelling catheter every shift  2.  Assess signs and symptoms of urinary retention  3.  Assess post-void residual volumes  4.  Implement bladder training program  5.  Encourage scheduled voidings  6.  Assist patient to sit on bedside commode or toilet for voiding  7.  Educate patient and family/caregiver on use and purpose of urine collection devices (document in Patient Education)  Outcome: Progressing  Note: No report of dysuria       Problem: Mobility  Goal: Patient's capacity to carry out activities will improve  Description: Target End Date:  Prior to discharge or change in level of care    1.  Assess for barriers to  mobility/activity  2.  Implement activity per interdisciplinary team recommendations  3.  Target activity level identified and patient/family/caregiver aware of goal  4.  Provide assistive devices  5.  Instruct patient/caregiver on proper use of assistive/adaptive devices  6.  Schedule activities and rest periods to decrease effects of fatigue  7.  Encourage mobilization to extent of ability  8.  Maintain proper body alignment  9.  Provide adequate pain management to allow progressive mobilization  10. Implement pace maker precautions as needed  Outcome: Progressing  Note: Pt independent in room ambulates to the bathroom with a steady gait.       Problem: Self Care  Goal: Patient will have the ability to perform ADLs independently or with assistance (bathe, groom, dress, toilet and feed)  Description: Target End Date:  Prior to discharge or change in level of care    Document on ADL flowsheet    1.  Assess the capability and level of deficiency to perform ADLs  2.  Encourage family/care giver involvement  3.  Provide assistive devices  4.  Consider PT/OT evaluations  5.  Maintain support, give positive feedback, encourage self-care allowing extra time and verbal cuing as needed  6.  Avoid doing something for patients they can do themselves, but provide assistance as needed  7.  Assist in anticipating/planning individual needs  8.  Collaborate with Case Management and  to meet discharge needs  Outcome: Progressing  Note: Encouraged continued oral care Pt understands it will protect him from developing a  respiratory infection.          Patient is not progressing towards the following goals:

## 2024-07-06 ENCOUNTER — HOSPITAL ENCOUNTER (INPATIENT)
Dept: RADIOLOGY | Facility: MEDICAL CENTER | Age: 29
DRG: 281 | End: 2024-07-06
Payer: MEDICAID

## 2024-07-06 VITALS
HEIGHT: 71 IN | WEIGHT: 288.36 LBS | RESPIRATION RATE: 17 BRPM | OXYGEN SATURATION: 92 % | DIASTOLIC BLOOD PRESSURE: 70 MMHG | SYSTOLIC BLOOD PRESSURE: 129 MMHG | TEMPERATURE: 97.9 F | BODY MASS INDEX: 40.37 KG/M2 | HEART RATE: 78 BPM

## 2024-07-06 LAB
ANION GAP SERPL CALC-SCNC: 11 MMOL/L (ref 7–16)
B PARAP IS1001 DNA NPH QL NAA+NON-PROBE: NOT DETECTED
B PERT.PT PRMT NPH QL NAA+NON-PROBE: NOT DETECTED
BUN SERPL-MCNC: 11 MG/DL (ref 8–22)
C PNEUM DNA NPH QL NAA+NON-PROBE: NOT DETECTED
CALCIUM SERPL-MCNC: 8.9 MG/DL (ref 8.5–10.5)
CHLORIDE SERPL-SCNC: 106 MMOL/L (ref 96–112)
CO2 SERPL-SCNC: 23 MMOL/L (ref 20–33)
CREAT SERPL-MCNC: 0.58 MG/DL (ref 0.5–1.4)
FLUAV RNA NPH QL NAA+NON-PROBE: NOT DETECTED
FLUAV RNA SPEC QL NAA+PROBE: NEGATIVE
FLUBV RNA NPH QL NAA+NON-PROBE: NOT DETECTED
FLUBV RNA SPEC QL NAA+PROBE: NEGATIVE
GFR SERPLBLD CREATININE-BSD FMLA CKD-EPI: 137 ML/MIN/1.73 M 2
GLUCOSE SERPL-MCNC: 89 MG/DL (ref 65–99)
HADV DNA NPH QL NAA+NON-PROBE: NOT DETECTED
HCOV 229E RNA NPH QL NAA+NON-PROBE: NOT DETECTED
HCOV HKU1 RNA NPH QL NAA+NON-PROBE: NOT DETECTED
HCOV NL63 RNA NPH QL NAA+NON-PROBE: NOT DETECTED
HCOV OC43 RNA NPH QL NAA+NON-PROBE: NOT DETECTED
HMPV RNA NPH QL NAA+NON-PROBE: NOT DETECTED
HPIV1 RNA NPH QL NAA+NON-PROBE: NOT DETECTED
HPIV2 RNA NPH QL NAA+NON-PROBE: NOT DETECTED
HPIV3 RNA NPH QL NAA+NON-PROBE: NOT DETECTED
HPIV4 RNA NPH QL NAA+NON-PROBE: NOT DETECTED
M PNEUMO DNA NPH QL NAA+NON-PROBE: NOT DETECTED
NUCLEAR IGG SER QL IA: DETECTED
POTASSIUM SERPL-SCNC: 4 MMOL/L (ref 3.6–5.5)
RSV RNA NPH QL NAA+NON-PROBE: NOT DETECTED
RSV RNA SPEC QL NAA+PROBE: NEGATIVE
RV+EV RNA NPH QL NAA+NON-PROBE: NOT DETECTED
SARS-COV-2 RNA NPH QL NAA+NON-PROBE: NOTDETECTED
SARS-COV-2 RNA RESP QL NAA+PROBE: NOTDETECTED
SODIUM SERPL-SCNC: 140 MMOL/L (ref 135–145)
SPECIMEN SOURCE: NORMAL

## 2024-07-06 PROCEDURE — 700102 HCHG RX REV CODE 250 W/ 637 OVERRIDE(OP)

## 2024-07-06 PROCEDURE — 0241U HCHG SARS-COV-2 COVID-19 NFCT DS RESP RNA 4 TRGT MIC: CPT

## 2024-07-06 PROCEDURE — A9270 NON-COVERED ITEM OR SERVICE: HCPCS | Performed by: INTERNAL MEDICINE

## 2024-07-06 PROCEDURE — 36415 COLL VENOUS BLD VENIPUNCTURE: CPT

## 2024-07-06 PROCEDURE — 0202U NFCT DS 22 TRGT SARS-COV-2: CPT

## 2024-07-06 PROCEDURE — 700102 HCHG RX REV CODE 250 W/ 637 OVERRIDE(OP): Performed by: INTERNAL MEDICINE

## 2024-07-06 PROCEDURE — 99238 HOSP IP/OBS DSCHRG MGMT 30/<: CPT | Mod: GC | Performed by: HOSPITALIST

## 2024-07-06 PROCEDURE — 80048 BASIC METABOLIC PNL TOTAL CA: CPT

## 2024-07-06 PROCEDURE — A9270 NON-COVERED ITEM OR SERVICE: HCPCS

## 2024-07-06 RX ORDER — AMLODIPINE BESYLATE 2.5 MG/1
2.5 TABLET ORAL DAILY
Qty: 30 TABLET | Refills: 2 | Status: SHIPPED | OUTPATIENT
Start: 2024-07-07

## 2024-07-06 RX ORDER — NITROGLYCERIN 0.4 MG/1
0.4 TABLET SUBLINGUAL PRN
Qty: 25 TABLET | Refills: 1 | Status: SHIPPED | OUTPATIENT
Start: 2024-07-06

## 2024-07-06 RX ORDER — METOPROLOL SUCCINATE 25 MG/1
25 TABLET, EXTENDED RELEASE ORAL
Status: DISCONTINUED | OUTPATIENT
Start: 2024-07-06 | End: 2024-07-06 | Stop reason: HOSPADM

## 2024-07-06 RX ORDER — ASPIRIN 81 MG/1
81 TABLET ORAL DAILY
Qty: 100 TABLET | Refills: 2 | Status: SHIPPED | OUTPATIENT
Start: 2024-07-07

## 2024-07-06 RX ORDER — METOPROLOL SUCCINATE 25 MG/1
25 TABLET, EXTENDED RELEASE ORAL DAILY
Qty: 30 TABLET | Refills: 2 | Status: SHIPPED | OUTPATIENT
Start: 2024-07-06

## 2024-07-06 RX ADMIN — LEVETIRACETAM 1000 MG: 500 TABLET, FILM COATED ORAL at 05:25

## 2024-07-06 RX ADMIN — METOPROLOL TARTRATE 12.5 MG: 25 TABLET, FILM COATED ORAL at 05:26

## 2024-07-06 RX ADMIN — ASPIRIN 81 MG: 81 TABLET, COATED ORAL at 05:25

## 2024-07-06 RX ADMIN — AMLODIPINE BESYLATE 2.5 MG: 2.5 TABLET ORAL at 05:26

## 2024-07-06 ASSESSMENT — PAIN DESCRIPTION - PAIN TYPE: TYPE: ACUTE PAIN

## 2024-07-06 ASSESSMENT — FIBROSIS 4 INDEX: FIB4 SCORE: 0.57

## 2024-07-06 NOTE — CARE PLAN
The patient is Stable - Low risk of patient condition declining or worsening    Shift Goals  Clinical Goals: rest, comfort, pulses  Patient Goals: rest, comfort  Family Goals: daisha    Progress made toward(s) clinical / shift goals:      Patient noted to be medically cleared, with possible discharge today.   Troponins continue to trend downward.     Problem: Knowledge Deficit - Standard  Goal: Patient and family/care givers will demonstrate understanding of plan of care, disease process/condition, diagnostic tests and medications  Description: Target End Date:  1-3 days or as soon as patient condition allows    Document in Patient Education    1.  Patient and family/caregiver oriented to unit, equipment, visitation policy and means for communicating concern  2.  Complete/review Learning Assessment  3.  Assess knowledge level of disease process/condition, treatment plan, diagnostic tests and medications  4.  Explain disease process/condition, treatment plan, diagnostic tests and medications  Outcome: Progressing  Note: Patient updated on plan of care, including continued post-op vital sign monitoring, cath-site monitoring, and prescribed medication regimen.      Problem: Communication  Goal: The ability to communicate needs accurately and effectively will improve  Description: Target End Date:  End of day 1    1.  Assess ability to communicate and understand  2.  Provide augmentative or alternative methods of communication devices  3.  Use /language line as appropriate  4.  Collaborate with Speech Therapy as needed  Outcome: Progressing  Flowsheets (Taken 7/6/2024 9868)  Communication:   Assessed patient's ability to understand and communicate   Oriented patient to call light     Problem: Respiratory  Goal: Patient will achieve/maintain optimum respiratory ventilation and gas exchange  Description: Target End Date:  Prior to discharge or change in level of care    Document on Assessment flowsheet    1.   Assess and monitor rate, rhythm, depth and effort of respiration  2.  Breath sounds assessed qshift and/or as needed  3.  Assess O2 saturation, administer/titrate oxygen as ordered  4.  Position patient for maximum ventilatory efficiency  5.  Turn, cough, and deep breath with splinting to improve effectiveness  6.  Collaborate with RT to administer medication/treatments per order  7.  Encourage use of incentive spirometer and encourage patient to cough after use and utilize splinting techniques if applicable  8.  Airway suctioning  9.  Monitor sputum production for changes in color, consistency and frequency  10. Perform frequent oral hygiene  11. Alternate physical activity with rest periods  Outcome: Progressing  Flowsheets (Taken 7/6/2024 0323)  O2 Delivery Device: None - Room Air     Problem: Mobility  Goal: Patient's capacity to carry out activities will improve  Description: Target End Date:  Prior to discharge or change in level of care    1.  Assess for barriers to mobility/activity  2.  Implement activity per interdisciplinary team recommendations  3.  Target activity level identified and patient/family/caregiver aware of goal  4.  Provide assistive devices  5.  Instruct patient/caregiver on proper use of assistive/adaptive devices  6.  Schedule activities and rest periods to decrease effects of fatigue  7.  Encourage mobilization to extent of ability  8.  Maintain proper body alignment  9.  Provide adequate pain management to allow progressive mobilization  10. Implement pace maker precautions as needed  Outcome: Progressing  Flowsheets (Taken 7/6/2024 0323)  Mobility:   Encouraged mobilization per interdisciplinary team recommendations   Monitored for signs of activity intolerance   Provided rest periods between activities     Problem: Self Care  Goal: Patient will have the ability to perform ADLs independently or with assistance (bathe, groom, dress, toilet and feed)  Description: Target End Date:  Prior  to discharge or change in level of care    Document on ADL flowsheet    1.  Assess the capability and level of deficiency to perform ADLs  2.  Encourage family/care giver involvement  3.  Provide assistive devices  4.  Consider PT/OT evaluations  5.  Maintain support, give positive feedback, encourage self-care allowing extra time and verbal cuing as needed  6.  Avoid doing something for patients they can do themselves, but provide assistance as needed  7.  Assist in anticipating/planning individual needs  8.  Collaborate with Case Management and  to meet discharge needs  Outcome: Progressing  Note: Patient able to complete all ADLs independently with no assistance from staff.      Problem: Wound/ / Incision Healing  Goal: Patient's wound/surgical incision will decrease in size and heals properly  Description: Target End Date:  Prior to discharge or change in level of care    Document on LDA    1.  Assess and document surgical incision/wound  2.  Provide incision/wound care per policy and/or provider orders  3.  Manage surgical drains per policy if applicable  4.  Encourage adequate nutrition to promote wound healing  5.  Collaborate with Clinical Dietician  Outcome: Progressing  Note: Cath-site monitored throughout the shift. Site dressing remains clean, dry, and intact. Pulses felt throughout the shift with no changes. Patient reports numbness and tingling of right hand during initial assessment has improved and continued to improve throughout the shift.        Patient is not progressing towards the following goals:

## 2024-07-06 NOTE — PROGRESS NOTES
Received bedside report and accepted care of patient.  Patient currently resting in bed in no visible or stated distress.  Bed controls on and bed in locked position.  Bed alarm on.  Call light and personal possessions within reach.  Plan of care to include pain management, assistance with ADL's and continued discharge planning.  Patient verbalizes agreement with plan of care, and has no additional questions or concerns at this time.  Will continue to update notes/plan of care as needed throughout shift.

## 2024-07-06 NOTE — DISCHARGE INSTRUCTIONS
Discharge Instructions    Discharged to home by car with relative. Discharged via walking, hospital escort: Refused.  Special equipment needed: Not Applicable    Be sure to schedule a follow-up appointment with your primary care doctor or any specialists as instructed.     Discharge Plan:   Diet Plan: Discussed  Activity Level: Discussed  Confirmed Follow up Appointment: Patient to Call and Schedule Appointment  Confirmed Symptoms Management: Discussed  Medication Reconciliation Updated: Yes    I understand that a diet low in cholesterol, fat, and sodium is recommended for good health. Unless I have been given specific instructions below for another diet, I accept this instruction as my diet prescription.   Other diet: Cardiac Diet    Special Instructions: Diagnosis:  Acute Coronary Syndrome (ACS) is a diagnosis that encompasses cardiac-related chest pain and heart attack. ACS occurs when the blood flow to the heart muscle is severely reduced or cut off completely due to a slow process called atherosclerosis.  Atherosclerosis is a disease in which the coronary arteries become narrow from a buildup of fat, cholesterol, and other substances that combine to form plaque. If the plaque breaks, a blood clot will form and block the blood flow to the heart muscle. This lack of blood flow can cause damage or death to the heart muscle which is called a heart attack or Myocardial Infarction (MI). There are two different types of MIs:  ST Elevation Myocardial Infarction or STEMI (the most severe type of heart attack) and Non-ST Elevation Myocardial Infarction or NSTEMI.    Treatment Plan:  Cardiac Diet  - Low fat, low salt, low cholesterol   Cardiac Rehab  - Your doctor has ordered you a referral to [facility].  Call [phone] to schedule an appointment.  Attend my follow-up appointment with my Cardiologist.  Take my medications as prescribed by my doctor  Exercise daily  Reduce stress    Medications:  Certain medications are  used to treat ACS.  Remember to always take medications as prescribed and never stop talking medications unless told by your doctor.    You have been prescribed the following medicatons:    Aspirin - Aspirin is used as a blood thinning medication and you will require this medication indefinitely.  Beta-Blocker - Beta-Blocker metoprolol is used to lower blood pressure and heart rate, and/or helps your heart heal after a heart attack.    -Is this patient being discharged with medication to prevent blood clots?  No    Is patient discharged on Warfarin / Coumadin?   No

## 2024-07-06 NOTE — DISCHARGE SUMMARY
[facility] Internal Medicine Discharge Summary    Attending: MATY Shah M.d.  Senior Resident: Dr. Lisa Valverde   Intern:  Dr. Shahram Acosta  Contact Number: [phone]    CHIEF COMPLAINT ON ADMISSION  Chief Complaint   Patient presents with    Chest Pain    Sent by MD     Transferred from [facility] for NonSTEMI. Aspirin and Lovenox given at transferring facility.       Reason for Admission  ems     Admission Date  7/3/2024    CODE STATUS  Full Code    HPI & HOSPITAL COURSE  Esteban Guy Jr. is a 28 y.o. male with a past medical history of seizure disorder on Keppra who presented 7/3/2024 with chest pain. On 7/2 patient states that he was sleeping when he suddenly developed central chest pain with radiation down his left arm and up his neck. It was associated with shortness of breath. His symptoms lasted several hours and mildly improved with rest. He denied any fevers, chills or cough.       His symptoms reoccurred on 7/3 and he decided to present to [facility] where he was noted to have an elevated troponin of 120 and was transferred to [facility] for further management.  Patient was given a full dose of aspirin and Lovenox prior to transfer.  Patient denies any history of smoking, alcohol abuse or drug abuse.  He denies any family history of MI, stroke or congestive heart failure. He denies any new medications.  He denies any previous history of blood clots or any recent surgery.  ER the patient is chest pain-free. Chest x-ray from the outlying facility showed no acute cardiopulmonary process. No pertinent findings on ECHO.Troponin T came down from 347 to 282. As per coronary angiography performed on 7/5, suspecting vasospastic etiology. Ordered 2.5 Mg daily amlodipine and sublingual nitroglycerin as needed.  He's feeling well today, not in acute distress and does not have any complaints or concerns. Plan to follow-up with outpatient cardiology for his cardiac MRI and positive BERT  panel.    Therefore, he is discharged in good and stable condition to home with close outpatient follow-up.    The patient met 2-midnight criteria for an inpatient stay at the time of discharge.    Discharge Date  7/6/2024    Physical Exam on Day of Discharge  Physical Exam    FOLLOW UP ITEMS POST DISCHARGE  Cardiac MRI.  Positive BERT panel.    DISCHARGE DIAGNOSES  Principal Problem:    NSTEMI (non-ST elevated myocardial infarction) (HCC) (POA: Yes)  Active Problems:    Seizure disorder (HCC) (POA: Yes)    Obesity (BMI 30-39.9) (POA: Yes)  Resolved Problems:    * No resolved hospital problems. *      MEDICATIONS ON DISCHARGE     Medication List        START taking these medications        Instructions   amLODIPine 2.5 MG Tabs  Start taking on: July 7, 2024  Commonly known as: Norvasc   Take 1 Tablet by mouth every day. Take one tablet every morning. .  Dose: 2.5 mg     aspirin 81 MG EC tablet  Start taking on: July 7, 2024   Take 1 Tablet by mouth every day.  Dose: 81 mg     metoprolol SR 25 MG Tb24  Commonly known as: Toprol XL   Take 1 Tablet by mouth every day. Every evening.  Dose: 25 mg     nitroglycerin 0.4 MG Subl  Commonly known as: Nitrostat   Place 1 Tablet under the tongue as needed for Chest Pain (up to 3 doses (if SBP greater than 90 mmHg)).  Dose: 0.4 mg            CONTINUE taking these medications        Instructions   levETIRAcetam 500 MG Tabs  Commonly known as: Keppra   Take 1,000 mg by mouth 2 times a day. 1,000 mg = 2 tabs  Dose: 1,000 mg     TYLENOL PO   Take 2 Tablets by mouth one time as needed (headache).  Dose: 2 Tablet              Allergies  No Known Allergies    DIET  Orders Placed This Encounter   Procedures    Diet Order Diet: Cardiac     Standing Status:   Standing     Number of Occurrences:   1     Order Specific Question:   Diet:     Answer:   Cardiac [6]       ACTIVITY  As tolerated.  Weight bearing as tolerated    CONSULTATIONS  Cardiology outpatient clinic for cardiac  MRI.    PROCEDURES  Left heart catheterization  Coronary angiography    LABORATORY  Lab Results   Component Value Date    SODIUM 140 07/06/2024    POTASSIUM 4.0 07/06/2024    CHLORIDE 106 07/06/2024    CO2 23 07/06/2024    GLUCOSE 89 07/06/2024    BUN 11 07/06/2024    CREATININE 0.58 07/06/2024        Lab Results   Component Value Date    WBC 8.1 07/03/2024    HEMOGLOBIN 14.7 07/03/2024    HEMATOCRIT 44.9 07/03/2024    PLATELETCT 206 07/03/2024        Total time of the discharge process exceeds 30 minutes.

## 2024-07-06 NOTE — CARE PLAN
The patient is Stable - Low risk of patient condition declining or worsening    Shift Goals  Clinical Goals: Clarify discharge plan today  Patient Goals: Cardiac MRI  Family Goals: Cardiac MRI    Progress made toward(s) clinical / shift goals:  Discharge plan clarified.    Problem: Knowledge Deficit - Standard  Goal: Patient and family/care givers will demonstrate understanding of plan of care, disease process/condition, diagnostic tests and medications  Outcome: Progressing     Problem: Psychosocial  Goal: Patient's level of anxiety will decrease  Outcome: Progressing     Problem: Communication  Goal: The ability to communicate needs accurately and effectively will improve  Outcome: Progressing       Patient is not progressing towards the following goals: No cardiac MRI tech available today. Patient will be following up outpatient per MD.

## 2024-07-06 NOTE — CARE PLAN
The patient is Stable - Low risk of patient condition declining or worsening    Shift Goals  Clinical Goals: Heparin drip, Cardiac cath  Patient Goals: comfort, rest  Family Goals: Return home    Pt had cardiac cath completed this AM with no problems noted. Pulse checks are WDL throughout the shift. Hemoband was removed following protocol.     Progress made toward(s) clinical / shift goals:    Problem: Knowledge Deficit - Standard  Goal: Patient and family/care givers will demonstrate understanding of plan of care, disease process/condition, diagnostic tests and medications  Outcome: Progressing     Problem: Psychosocial  Goal: Patient's level of anxiety will decrease  Outcome: Progressing       Patient is not progressing towards the following goals:

## 2024-07-07 LAB
ANA PAT SER IF-IMP: ABNORMAL
ANA PAT SER IF-IMP: ABNORMAL
NUCLEAR IGG SER QL IF: DETECTED
NUCLEAR IGG TITR SER IF: ABNORMAL {TITER}

## 2024-07-07 NOTE — PROGRESS NOTES
Patient prepared for discharge.  Personal possessions gathered for patient.  Discharge instructions reviewed and patient verbalizes understanding.  IV's removed without complication, tips clean and intact.  Patient safely off unit with family.

## 2024-07-08 LAB — DSDNA AB TITR SER CLIF: NORMAL {TITER}

## 2024-07-09 LAB — U1 SNRNP IGG SER QL: 5 UNITS (ref 0–19)

## 2024-07-10 LAB
ENA JO1 AB TITR SER: 9 AU/ML (ref 0–40)
ENA SCL70 IGG SER QL: 3 AU/ML (ref 0–40)
ENA SM IGG SER-ACNC: 4 AU/ML (ref 0–40)
ENA SS-A 60KD AB SER-ACNC: 0 AU/ML (ref 0–40)
ENA SS-A IGG SER QL: 1 AU/ML (ref 0–40)
ENA SS-B IGG SER IA-ACNC: 27 AU/ML (ref 0–40)

## 2024-07-11 LAB
CV A10 AB TITR SER CF: NORMAL {TITER}
CV A16 AB TITR SER CF: NORMAL {TITER}
CV A2 AB TITR SER CF: NORMAL {TITER}
CV A4 AB TITR SER CF: NORMAL {TITER}
CV A7 AB TITR SER CF: NORMAL {TITER}
CV A9 AB TITR SER CF: NORMAL {TITER}

## 2024-08-15 ENCOUNTER — OFFICE VISIT (OUTPATIENT)
Dept: MEDICAL GROUP | Facility: MEDICAL CENTER | Age: 29
End: 2024-08-15
Payer: MEDICAID

## 2024-08-15 VITALS
TEMPERATURE: 97.3 F | DIASTOLIC BLOOD PRESSURE: 80 MMHG | HEIGHT: 71 IN | BODY MASS INDEX: 43.09 KG/M2 | HEART RATE: 95 BPM | OXYGEN SATURATION: 95 % | RESPIRATION RATE: 16 BRPM | WEIGHT: 307.8 LBS | SYSTOLIC BLOOD PRESSURE: 120 MMHG

## 2024-08-15 DIAGNOSIS — E78.5 DYSLIPIDEMIA: ICD-10-CM

## 2024-08-15 DIAGNOSIS — I21.4 NSTEMI (NON-ST ELEVATED MYOCARDIAL INFARCTION) (HCC): ICD-10-CM

## 2024-08-15 DIAGNOSIS — R73.03 PREDIABETES: ICD-10-CM

## 2024-08-15 DIAGNOSIS — R06.83 SNORING: ICD-10-CM

## 2024-08-15 DIAGNOSIS — G40.909 SEIZURE DISORDER (HCC): ICD-10-CM

## 2024-08-15 PROCEDURE — 99213 OFFICE O/P EST LOW 20 MIN: CPT

## 2024-08-15 PROCEDURE — 99204 OFFICE O/P NEW MOD 45 MIN: CPT

## 2024-08-15 PROCEDURE — 3074F SYST BP LT 130 MM HG: CPT

## 2024-08-15 PROCEDURE — 3079F DIAST BP 80-89 MM HG: CPT

## 2024-08-15 ASSESSMENT — FIBROSIS 4 INDEX: FIB4 SCORE: 0.59

## 2024-08-15 NOTE — PROGRESS NOTES
Verbal consent was acquired by the patient to use [employer] ambient listening note generation during this visit     Subjective:     CC:  Diagnoses of Prediabetes, NSTEMI (non-ST elevated myocardial infarction) (HCC), Dyslipidemia, Snoring, and Seizure disorder (HCC) were pertinent to this visit.    HISTORY OF THE PRESENT ILLNESS: Patient is a 29 y.o. male.     History of Present Illness  The patient presents for evaluation of multiple medical concerns. He is accompanied by his mother.    He experienced a seizure two weeks ago, with the previous one occurring last year. His first seizure occurred at the age of 16 while playing a computer game, possibly triggered by the flashing lights. Despite efforts to limit his janey, seizures have continued to occur, now happening once or twice a year. His mother expresses concern about his condition, especially when he is at work or engaged in other activities. Dr. Henderson suggested that avoiding computer use could potentially lead to a seizure-free state. He also experiences back pain, which he attributes to a fall during a seizure. His memory has been affected as well.    He has been advised to lose weight and incorporate more fish, chicken, and vegetables into his diet, while reducing salt intake. His diet currently consists of chicken, broccoli, protein shakes, and eggs. He has expressed concerns about potential kidney damage from protein shakes. He has eliminated soda, juice, chocolate, chips, and lai from his diet. He is considering adopting a ketogenic diet.    He is scheduled to see a cardiologist on 09/04/2024. He does not require any medication refills at this time.    Health Maintenance: reviewed and completed per patient preference.     ROS:   - CONSTITUTIONAL: Denies weight loss, fever and chills.  - HEENT: Denies changes in vision and hearing.  - RESPIRATORY: Denies SOB and cough.  - CV: Denies palpitations and CP.  - GI: Denies abdominal pain, nausea,  vomiting and diarrhea.  - : Denies dysuria and urinary frequency.  - MSK: Denies myalgia and joint pain.  - SKIN: Denies rash and pruritus.  - NEUROLOGICAL: Denies headache and syncope.  - PSYCHIATRIC: Denies recent changes in mood. Denies anxiety and depression.           Social History     Socioeconomic History    Marital status: Single     Spouse name: Not on file    Number of children: Not on file    Years of education: Not on file    Highest education level: Not on file   Occupational History    Not on file   Tobacco Use    Smoking status: Never    Smokeless tobacco: Never   Vaping Use    Vaping status: Former    Substances: THC   Substance and Sexual Activity    Alcohol use: No    Drug use: No    Sexual activity: Not on file   Other Topics Concern    Not on file   Social History Narrative    Not on file     Social Determinants of Health     Financial Resource Strain: Not on file   Food Insecurity: No Food Insecurity (7/3/2024)    Hunger Vital Sign     Worried About Running Out of Food in the Last Year: Never true     Ran Out of Food in the Last Year: Never true   Transportation Needs: No Transportation Needs (7/3/2024)    PRAPARE - Transportation     Lack of Transportation (Medical): No     Lack of Transportation (Non-Medical): No   Physical Activity: Not on file   Stress: Not on file   Social Connections: Not on file   Intimate Partner Violence: Not At Risk (7/3/2024)    Humiliation, Afraid, Rape, and Kick questionnaire     Fear of Current or Ex-Partner: No     Emotionally Abused: No     Physically Abused: No     Sexually Abused: No   Housing Stability: Low Risk  (7/3/2024)    Housing Stability Vital Sign     Unable to Pay for Housing in the Last Year: No     Number of Places Lived in the Last Year: 1     Unstable Housing in the Last Year: No      No Known Allergies         Current Outpatient Medications:     amLODIPine, 2.5 mg, Oral, DAILY    aspirin, 81 mg, Oral, DAILY    metoprolol SR, 25 mg, Oral,  "DAILY    nitroglycerin, 0.4 mg, Sublingual, PRN    levETIRAcetam, 1,000 mg, Oral, BID    Acetaminophen (TYLENOL PO), 2 Tablet, Oral, Once PRN   Objective:     Exam: /80 (BP Location: Right arm, Patient Position: Sitting, BP Cuff Size: Adult)   Pulse 95   Temp 36.3 °C (97.3 °F) (Temporal)   Resp 16   Ht 1.803 m (5' 11\")   Wt (!) 140 kg (307 lb 12.8 oz)   SpO2 95%  Body mass index is 42.93 kg/m².    Physical Exam:  Constitutional: Alert, no distress, well-groomed.  Skin: Warm, dry, good turgor, no rashes in visible areas.  Eye: Equal, round and reactive, conjunctiva clear, lids normal.  ENMT: Lips without lesions, good dentition, moist mucous membranes.  Neck: Trachea midline, no masses, no thyromegaly.  Respiratory: Unlabored respiratory effort, no cough.  Abd: soft, non tender, non distended, normal BS  MSK: Normal gait, moves all extremities.  Neuro: Grossly non-focal.   Psych: Alert and oriented x3, normal affect and mood.    STOPBANG - Sleep Apnea Screening      Flowsheet Row Most Recent Value   S - Have you been told that you SNORE? Yes   T - Are you often TIRED during the day? Yes   O - Do you know if you stop breathing or has anyone witnessed you stop breathing while you were asleep? (OBSTRUCTION) No   P - Do you have high blood PRESSURE or on medication to control high blood pressure? Yes   B - Is your Body Mass Index greater than 35? (BMI) Yes   A - Are you 50 years old or older? (AGE) No   N - Are you a male with a NECK circumference greater than 17 inches, or a female with a neck circumference greater than 16 inches? Yes   G - Are you male? (GENDER) Yes   STOPBANG Total Score 6   BENJI Risk High Risk              Labs: Reviewed    Assessment & Plan:   29 y.o. male with the following -    1. Prediabetes  Acute issue noted on most recent labs.   - Reviewed results as above in detail  - Discussed A1c returns with increased risk of developing T2DM   - Recommend intensive lifestyle modifications to " help progression including consumption of:  nutrient-dense foods that are high in fiber (? 14 g of fiber per 1,000 kcal), minimally processed carbohydrates (such as non-starchy vegetables, fruits, legumes, and whole grains), and non-dairy products with minimally added sugar over intake from other carbohydrate sources   Plant based or Mediterranean-style diet rich in monounsaturated and polyunsaturated fats to improve glucose metabolism and reduce cardiovascular disease risk   foods rich in omega-3 fatty acids (such as fatty fish, nuts, and seeds) to prevent/treat cardiovascular disease   replace consumption of sugar-sweetened beverages (including fruit juices) with water or low calorie, no calorie beverages in order to control glycemia and reduce risk for cardiometabolic disease   minimize consumption of foods with added sugars   - Encouraged heart healthy diet and lifestyle with additional recommendations and resources shared via [employer]  - Diet plus physical activity may reduce risk of diabetes in adults with prediabetes  - Advised avoidance of using cigarettes, e-cigarettes, or other tobacco products  - Repeat A1C in 6 months to reassess      2. NSTEMI (non-ST elevated myocardial infarction) (HCC)  Chronic, stable.  Asymptomatic.  Patient reports tolerating his amlodipine, and metoprolol well.  He has an appointment to see cardiology on September 4 at 1:20 PM.  Denies using his nitroglycerin.    3. Dyslipidemia  In depth discussion about the importance of keeping levels within normal limits and the negative effects that can occur such as heart attack, stroke and other vascular disease. Patient and provider have agreed to a three month trial of lifestyle modification to gain control of the levels. This includes low fat, low carbohydrate diet with increased fruits and vegetables. Exercise for 30 minutes continuous physical activity as they can tolerate. We will recheck labs in 3 months. If levels are not  significantly improved we will consider statin therapy. Patient is in agreement with this plan.     4. Snoring  Chronic issue.  STOP-BANG score of 6.  Given that he is high risk for sleep apnea we will have him follow-up and overnight home sleep study.  - Overnight Home Sleep Study; Future    5. Seizure disorder (HCC)  Chronic, ongoing.  Currently taking 1000 mg of Keppra 2 times daily.  He does report his last seizure was 2 weeks ago but prior to that it was over a year ago.  Mom reports that it happens with prolonged videogame use. A referral to a neurologist has been initiated for further evaluation and management. Diet and lifestyle modifications, including reducing computer use, are recommended to help manage his seizures.  He currently has enough of his medications including refills.  - Referral to Neurology        Return if symptoms worsen or fail to improve.    Please note that this dictation was created using voice recognition software. I have made every reasonable attempt to correct obvious errors, but I expect that there are errors of grammar and possibly content that I did not discover before finalizing the note.

## 2024-08-19 ENCOUNTER — TELEPHONE (OUTPATIENT)
Dept: CARDIOLOGY | Facility: MEDICAL CENTER | Age: 29
End: 2024-08-19
Payer: MEDICAID

## 2024-08-19 NOTE — TELEPHONE ENCOUNTER
Spoke to patient in regards to records for NP appointment with [facility].     Patient has seen a cardiologist before?  No       Any recent cardiac testing outside of [facility]?  No       Were any records requested?  No

## 2024-09-04 ENCOUNTER — OFFICE VISIT (OUTPATIENT)
Dept: CARDIOLOGY | Facility: MEDICAL CENTER | Age: 29
End: 2024-09-04
Payer: MEDICAID

## 2024-09-04 VITALS
RESPIRATION RATE: 16 BRPM | DIASTOLIC BLOOD PRESSURE: 82 MMHG | HEIGHT: 71 IN | SYSTOLIC BLOOD PRESSURE: 132 MMHG | HEART RATE: 81 BPM | BODY MASS INDEX: 41.66 KG/M2 | OXYGEN SATURATION: 96 % | WEIGHT: 297.6 LBS

## 2024-09-04 DIAGNOSIS — R07.89 OTHER CHEST PAIN: ICD-10-CM

## 2024-09-04 DIAGNOSIS — E78.5 DYSLIPIDEMIA: ICD-10-CM

## 2024-09-04 DIAGNOSIS — I10 ESSENTIAL HYPERTENSION, BENIGN: ICD-10-CM

## 2024-09-04 PROCEDURE — 99214 OFFICE O/P EST MOD 30 MIN: CPT | Performed by: INTERNAL MEDICINE

## 2024-09-04 PROCEDURE — 99202 OFFICE O/P NEW SF 15 MIN: CPT | Performed by: INTERNAL MEDICINE

## 2024-09-04 PROCEDURE — 99212 OFFICE O/P EST SF 10 MIN: CPT | Performed by: INTERNAL MEDICINE

## 2024-09-04 PROCEDURE — 3079F DIAST BP 80-89 MM HG: CPT | Performed by: INTERNAL MEDICINE

## 2024-09-04 PROCEDURE — 3075F SYST BP GE 130 - 139MM HG: CPT | Performed by: INTERNAL MEDICINE

## 2024-09-04 ASSESSMENT — ENCOUNTER SYMPTOMS
HEADACHES: 0
MYALGIAS: 0
CHILLS: 0
COUGH: 0
CARDIOVASCULAR NEGATIVE: 1
BLURRED VISION: 0
DOUBLE VISION: 0
CLAUDICATION: 0
WEAKNESS: 0
RESPIRATORY NEGATIVE: 1
GASTROINTESTINAL NEGATIVE: 1
EYES NEGATIVE: 1
FEVER: 0
DEPRESSION: 0
DIZZINESS: 0
PALPITATIONS: 0
SHORTNESS OF BREATH: 0
VOMITING: 0
WEIGHT LOSS: 0
BRUISES/BLEEDS EASILY: 0
NEUROLOGICAL NEGATIVE: 1
NERVOUS/ANXIOUS: 0
CONSTITUTIONAL NEGATIVE: 1
PSYCHIATRIC NEGATIVE: 1
NAUSEA: 0
ABDOMINAL PAIN: 0
MUSCULOSKELETAL NEGATIVE: 1
FOCAL WEAKNESS: 0

## 2024-09-04 ASSESSMENT — FIBROSIS 4 INDEX: FIB4 SCORE: 0.59

## 2024-09-04 NOTE — PROGRESS NOTES
Chief Complaint   Patient presents with    MI (Non ST Segment Elevation MI)    New Patient              Subjective     Esteban Guy Jr. is a 29 y.o. male who presents today for hospital follow up.    Since the [place] [facility] [place] [facility] [place] for unspecified chest pain with slow flow noted on his cardiac catheterization, he has been doing well from cardiac standpoint. He admits to short sharp chest pains. He denies fatigue, shortness of breath, palpitations, lower extremity edema, dizziness or syncope. He is inactive. 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     Past Medical History:   Diagnosis Date    Hyperlipidemia     Hypertension     Seizure (HCC)      Past Surgical History:   Procedure Laterality Date    ZZZ CARDIAC CATH       Family History   Problem Relation Age of Onset    Heart Attack Neg Hx     Heart Disease Neg Hx      Social History     Socioeconomic History    Marital status: Single     Spouse name: Not on file    Number of children: Not on file    Years of education: Not on file    Highest education level: Not on file   Occupational History    Not on file   Tobacco Use    Smoking status: Never    Smokeless tobacco: Never   Vaping Use    Vaping status: Former    Substances: THC   Substance and Sexual Activity    Alcohol use: No    Drug use: No    Sexual activity: Not on file   Other Topics Concern    Not on file   Social History Narrative    Not on file     Social Determinants of Health     Financial Resource Strain: Not on file   Food Insecurity: No Food Insecurity (7/3/2024)    Hunger Vital Sign     Worried About Running Out of Food in the Last Year: Never true     Ran Out of Food in the Last Year: Never true   Transportation Needs: No Transportation Needs (7/3/2024)    PRAPARE - Transportation     Lack of Transportation (Medical): No     Lack of Transportation (Non-Medical): No   Physical Activity: Not on file   Stress: Not on file   Social Connections: Not on file    Intimate Partner Violence: Not At Risk (7/3/2024)    Humiliation, Afraid, Rape, and Kick questionnaire     Fear of Current or Ex-Partner: No     Emotionally Abused: No     Physically Abused: No     Sexually Abused: No   Housing Stability: Low Risk  (7/3/2024)    Housing Stability Vital Sign     Unable to Pay for Housing in the Last Year: No     Number of Places Lived in the Last Year: 1     Unstable Housing in the Last Year: No     No Known Allergies    (Medications reviewed.)  Outpatient Encounter Medications as of 9/4/2024   Medication Sig Dispense Refill    amLODIPine (NORVASC) 2.5 MG Tab Take 1 Tablet by mouth every day. Take one tablet every morning. . 30 Tablet 2    aspirin 81 MG EC tablet Take 1 Tablet by mouth every day. 100 Tablet 2    metoprolol SR (TOPROL XL) 25 MG TABLET SR 24 HR Take 1 Tablet by mouth every day. Every evening. 30 Tablet 2    nitroglycerin (NITROSTAT) 0.4 MG SL Tab Place 1 Tablet under the tongue as needed for Chest Pain (up to 3 doses (if SBP greater than 90 mmHg)). 25 Tablet 1    levETIRAcetam (KEPPRA) 500 MG Tab Take 1,000 mg by mouth 2 times a day. 1,000 mg = 2 tabs      Acetaminophen (TYLENOL PO) Take 2 Tablets by mouth one time as needed (headache).       No facility-administered encounter medications on file as of 9/4/2024.     Review of Systems   Constitutional: Negative.  Negative for chills, fever, malaise/fatigue and weight loss.   HENT: Negative.  Negative for hearing loss.    Eyes: Negative.  Negative for blurred vision and double vision.   Respiratory: Negative.  Negative for cough and shortness of breath.    Cardiovascular: Negative.  Negative for chest pain, palpitations, claudication and leg swelling.   Gastrointestinal: Negative.  Negative for abdominal pain, nausea and vomiting.   Genitourinary: Negative.  Negative for dysuria and urgency.   Musculoskeletal: Negative.  Negative for joint pain and myalgias.   Skin: Negative.  Negative for itching and rash.  "  Neurological: Negative.  Negative for dizziness, focal weakness, weakness and headaches.   Endo/Heme/Allergies: Negative.  Does not bruise/bleed easily.   Psychiatric/Behavioral: Negative.  Negative for depression. The patient is not nervous/anxious.               Objective     /82 (BP Location: Left arm, Patient Position: Sitting, BP Cuff Size: Adult)   Pulse 81   Resp 16   Ht 1.803 m (5' 11\")   Wt (!) 135 kg (297 lb 9.6 oz)   SpO2 96%   BMI 41.51 kg/m²     Physical Exam  Constitutional:       Appearance: Normal appearance. He is well-developed and normal weight.   HENT:      Head: Normocephalic and atraumatic.   Neck:      Vascular: No JVD.   Cardiovascular:      Rate and Rhythm: Normal rate and regular rhythm.      Heart sounds: Normal heart sounds.   Pulmonary:      Effort: Pulmonary effort is normal.      Breath sounds: Normal breath sounds.   Abdominal:      General: Bowel sounds are normal.      Palpations: Abdomen is soft.      Comments: No hepatosplenomegaly.   Musculoskeletal:         General: Normal range of motion.   Lymphadenopathy:      Cervical: No cervical adenopathy.   Skin:     General: Skin is warm and dry.   Neurological:      Mental Status: He is alert and oriented to person, place, and time.            CARDIAC STUDIES/PROCEDURES:    CARDIAC CATHETERIZATION CONCLUSIONS by Palomo Suarez (07/05/24)  1.  Normal-appearing epicardial coronary arteries with mild slow coronary flow in the LAD which can reflect either underlying endothelial dysfunction/microvascular disease or due to elevated LVEDP  2.  Consider myocarditis as a differential for his presentation and elevated troponin.   3.  Elevated resting LVEDP 20-25 mmHg without significant transaortic gradient on pullback  (study result reviewed)    ECHOCARDIOGRAM CONCLUSIONS (07/04/24)  Normal left ventricular size, wall thickness, and systolic function.   Normal diastolic function.  The right ventricle is normal in size and " systolic function.  Normal left atrial size.  No pericardial effusion.  No prior study is available for comparison.    (study result reviewed)     EKG performed on (07/04/24) was reviewed: EKG personally interpreted shows sinus rhythm.     Laboratory results of (07/03/24) were reviewed. Cholesterol profile of 159/122/31/104 mg/dL noted.    Assessment & Plan     1. Other chest pain        2. Essential hypertension, benign        3. Dyslipidemia            Medical Decision Making: Today's Assessment/Status/Plan:        Chest pain: He is a 29 year old male with hypertension and hyperlipidemia. He underwent cardiac catheterization showing slow flow in the left anterior descending artery possibly related to endothelial dysfunction or microvascular disease. We will perform a cardiac MRI as recommended and follow up with him.    Hypertension: Blood pressure is well controlled. We will continue with amlodipine, metoprolol.  Hyperlipidemia: Currently stable on strict diet and exercise therapy. We will repeat labs including fasting lipid profile.     We will follow up in 3 months.    CC Emilie Devine

## 2024-09-19 ENCOUNTER — HOME STUDY (OUTPATIENT)
Dept: SLEEP MEDICINE | Facility: MEDICAL CENTER | Age: 29
End: 2024-09-19
Payer: MEDICAID

## 2024-09-19 DIAGNOSIS — R06.83 SNORING: ICD-10-CM

## 2024-09-19 PROCEDURE — 95800 SLP STDY UNATTENDED: CPT | Performed by: STUDENT IN AN ORGANIZED HEALTH CARE EDUCATION/TRAINING PROGRAM

## 2024-10-02 DIAGNOSIS — I21.4 NSTEMI (NON-ST ELEVATED MYOCARDIAL INFARCTION) (HCC): ICD-10-CM

## 2024-10-02 RX ORDER — AMLODIPINE BESYLATE 2.5 MG/1
2.5 TABLET ORAL DAILY
Qty: 30 TABLET | Refills: 2 | Status: SHIPPED | OUTPATIENT
Start: 2024-10-02

## 2024-10-02 RX ORDER — METOPROLOL SUCCINATE 25 MG/1
25 TABLET, EXTENDED RELEASE ORAL DAILY
Qty: 30 TABLET | Refills: 2 | Status: SHIPPED | OUTPATIENT
Start: 2024-10-02

## 2024-10-02 RX ORDER — ASPIRIN 81 MG/1
81 TABLET ORAL DAILY
Qty: 100 TABLET | Refills: 2 | Status: SHIPPED | OUTPATIENT
Start: 2024-10-02

## 2024-10-02 RX ORDER — NITROGLYCERIN 0.4 MG/1
0.4 TABLET SUBLINGUAL PRN
Qty: 25 TABLET | Refills: 1 | Status: SHIPPED | OUTPATIENT
Start: 2024-10-02

## 2024-10-15 DIAGNOSIS — G47.30 SLEEP APNEA, UNSPECIFIED TYPE: ICD-10-CM

## 2024-11-05 ENCOUNTER — OFFICE VISIT (OUTPATIENT)
Dept: MEDICAL GROUP | Facility: MEDICAL CENTER | Age: 29
End: 2024-11-05
Attending: INTERNAL MEDICINE
Payer: MEDICAID

## 2024-11-05 ENCOUNTER — HOSPITAL ENCOUNTER (OUTPATIENT)
Dept: LAB | Facility: MEDICAL CENTER | Age: 29
End: 2024-11-05
Attending: INTERNAL MEDICINE
Payer: MEDICAID

## 2024-11-05 VITALS
DIASTOLIC BLOOD PRESSURE: 80 MMHG | BODY MASS INDEX: 44.1 KG/M2 | HEART RATE: 104 BPM | SYSTOLIC BLOOD PRESSURE: 124 MMHG | WEIGHT: 315 LBS | TEMPERATURE: 97.7 F | HEIGHT: 71 IN | OXYGEN SATURATION: 94 % | RESPIRATION RATE: 16 BRPM

## 2024-11-05 DIAGNOSIS — G47.33 MODERATE OBSTRUCTIVE SLEEP APNEA: ICD-10-CM

## 2024-11-05 DIAGNOSIS — R63.2 POLYPHAGIA: ICD-10-CM

## 2024-11-05 DIAGNOSIS — E78.5 DYSLIPIDEMIA: ICD-10-CM

## 2024-11-05 DIAGNOSIS — M54.50 LUMBAR PAIN: ICD-10-CM

## 2024-11-05 LAB
HBA1C MFR BLD: 5.5 % (ref ?–5.8)
POCT INT CON NEG: NEGATIVE
POCT INT CON POS: POSITIVE

## 2024-11-05 PROCEDURE — 3074F SYST BP LT 130 MM HG: CPT

## 2024-11-05 PROCEDURE — 36415 COLL VENOUS BLD VENIPUNCTURE: CPT

## 2024-11-05 PROCEDURE — 80053 COMPREHEN METABOLIC PANEL: CPT

## 2024-11-05 PROCEDURE — 99214 OFFICE O/P EST MOD 30 MIN: CPT

## 2024-11-05 PROCEDURE — 83036 HEMOGLOBIN GLYCOSYLATED A1C: CPT

## 2024-11-05 PROCEDURE — 3079F DIAST BP 80-89 MM HG: CPT

## 2024-11-05 PROCEDURE — 80061 LIPID PANEL: CPT

## 2024-11-05 ASSESSMENT — FIBROSIS 4 INDEX: FIB4 SCORE: 0.59

## 2024-11-05 NOTE — PROGRESS NOTES
Verbal consent was acquired by the patient to use [employer] ambient listening note generation during this visit     Subjective:     CC:  Diagnoses of Moderate obstructive sleep apnea, BMI 40.0-44.9, adult (HCC), Lumbar pain, and Polyphagia were pertinent to this visit.    HISTORY OF THE PRESENT ILLNESS: Patient is a 29 y.o. male.     History of Present Illness  The patient presents for evaluation of multiple medical concerns. He is accompanied by an adult female.    He has been diagnosed with sleep apnea and is currently under the care of a cardiologist, Dr. Magallon, who has ordered blood work. He is scheduled for a heart MRI on Friday.    He has consulted a neurologist due to his borderline diabetes. Despite his condition, he has not made any dietary changes. He has experienced weight gain over the past three months, which he attributes to consuming Mexican food during a visit to his grandmother's house. He has a craving for sweets, particularly ice cream and chocolates.    He has a history of falls due to seizures, which have resulted in injuries. He recalls an incident where he blacked out and fell, which he believes may have caused his back injury. He experiences episodes of staring off into the distance, which he cannot control. His medication dosage was recently adjusted by his neurologist.    He experiences back pain when sitting, which subsides upon standing. The pain does not radiate to his legs, and he reports no numbness or tingling. The pain intensifies when he lies down and moves his back or legs.    He has flat feet and experiences leg pain.    He is currently unemployed and spends most of his time at home, which he finds boring. He reports no feelings of depression.    Health Maintenance: reviewed and completed per patient preference.     ROS:   - CONSTITUTIONAL: Denies weight loss, fever and chills.  - HEENT: Denies changes in vision and hearing.  - RESPIRATORY: Denies SOB and cough.  - CV: Denies  palpitations and CP.  - GI: Denies abdominal pain, nausea, vomiting and diarrhea.  - : Denies dysuria and urinary frequency.  - MSK: Denies myalgia and joint pain.  - SKIN: Denies rash and pruritus.  - NEUROLOGICAL: Denies headache and syncope.  - PSYCHIATRIC: Denies recent changes in mood. Denies anxiety and depression.           Social History     Socioeconomic History    Marital status: Single     Spouse name: Not on file    Number of children: Not on file    Years of education: Not on file    Highest education level: Not on file   Occupational History    Not on file   Tobacco Use    Smoking status: Never    Smokeless tobacco: Never   Vaping Use    Vaping status: Former    Substances: THC   Substance and Sexual Activity    Alcohol use: No    Drug use: No    Sexual activity: Not on file   Other Topics Concern    Not on file   Social History Narrative    Not on file     Social Drivers of Health     Financial Resource Strain: Not on file   Food Insecurity: No Food Insecurity (7/3/2024)    Hunger Vital Sign     Worried About Running Out of Food in the Last Year: Never true     Ran Out of Food in the Last Year: Never true   Transportation Needs: No Transportation Needs (7/3/2024)    PRAPARE - Transportation     Lack of Transportation (Medical): No     Lack of Transportation (Non-Medical): No   Physical Activity: Not on file   Stress: Not on file   Social Connections: Not on file   Intimate Partner Violence: Not At Risk (7/3/2024)    Humiliation, Afraid, Rape, and Kick questionnaire     Fear of Current or Ex-Partner: No     Emotionally Abused: No     Physically Abused: No     Sexually Abused: No   Housing Stability: Low Risk  (7/3/2024)    Housing Stability Vital Sign     Unable to Pay for Housing in the Last Year: No     Number of Places Lived in the Last Year: 1     Unstable Housing in the Last Year: No      No Known Allergies         Current Outpatient Medications:     metoprolol SR, 25 mg, Oral, DAILY, Taking     "nitroglycerin, 0.4 mg, Sublingual, PRN, Taking    aspirin, 81 mg, Oral, DAILY, Taking    amLODIPine, 2.5 mg, Oral, DAILY, Taking    levETIRAcetam, 1,000 mg, Oral, BID, Taking    Acetaminophen (TYLENOL PO), 2 Tablet, Oral, Once PRN, Taking   Objective:     Exam: /80 (BP Location: Left arm, Patient Position: Sitting, BP Cuff Size: Large adult)   Pulse (!) 104   Temp 36.5 °C (97.7 °F) (Temporal)   Resp 16   Ht 1.803 m (5' 11\")   Wt (!) 145 kg (319 lb)   SpO2 94%  Body mass index is 44.49 kg/m².    Physical Exam:  Constitutional: Alert, no distress, well-groomed.  Skin: Warm, dry, good turgor, no rashes in visible areas.  Eye: Equal, round and reactive, conjunctiva clear, lids normal.  ENMT: Lips without lesions, good dentition, moist mucous membranes.  Neck: Trachea midline, no masses, no thyromegaly.  Respiratory: Unlabored respiratory effort, no cough.  Abd: soft, non tender, non distended, normal BS  MSK: Normal gait, moves all extremities.  Lumbar spine tender to palpation.  Strength 5 out of 5.  Neuro: Grossly non-focal.   Psych: Alert and oriented x3, normal affect and mood.    Labs: Reviewed    Assessment & Plan:   29 y.o. male with the following -  1. Moderate obstructive sleep apnea  - DME CPAP    2. BMI 40.0-44.9, adult (HCC)  - Referral to Nutrition Services    3. Lumbar pain  - DX-LUMBAR SPINE-4+ VIEWS; Future    4. Polyphagia  - POCT  A1C      Assessment & Plan  1. Sleep Apnea.  He has been diagnosed with sleep apnea. A CPAP machine will be ordered, and it may take about 3 months to receive it. The company that takes his insurance will reach out to him for fitting.    2. Borderline Diabetes.  A referral to a nutritionist will be made to help manage his diet. He has been advised to lose 10 percent of his body weight in the coming months.  Hemoglobin A1c 5.5%.    3. Back Pain.  An x-ray of his back will be ordered to assess for any damage from previous falls related to seizures. He is encouraged " to maintain physical activity, strengthen his core, and avoid prolonged bed rest.    4. Seizure Disorder.  He is advised to follow up with neurology for brain imaging and medication adjustments. He should stay active and avoid prolonged inactivity to help manage his condition.    5. Flatfeet.  A referral to a podiatrist will be provided to assess his flatfeet and possibly get inserts for his shoes.        Return in about 3 months (around 2/5/2025) for Weight check.    Please note that this dictation was created using voice recognition software. I have made every reasonable attempt to correct obvious errors, but I expect that there are errors of grammar and possibly content that I did not discover before finalizing the note.

## 2024-11-06 LAB
ALBUMIN SERPL BCP-MCNC: 4.8 G/DL (ref 3.2–4.9)
ALBUMIN/GLOB SERPL: 1.5 G/DL
ALP SERPL-CCNC: 84 U/L (ref 30–99)
ALT SERPL-CCNC: 72 U/L (ref 2–50)
ANION GAP SERPL CALC-SCNC: 12 MMOL/L (ref 7–16)
AST SERPL-CCNC: 52 U/L (ref 12–45)
BILIRUB SERPL-MCNC: 0.7 MG/DL (ref 0.1–1.5)
BUN SERPL-MCNC: 18 MG/DL (ref 8–22)
CALCIUM ALBUM COR SERPL-MCNC: 9.7 MG/DL (ref 8.5–10.5)
CALCIUM SERPL-MCNC: 10.3 MG/DL (ref 8.5–10.5)
CHLORIDE SERPL-SCNC: 101 MMOL/L (ref 96–112)
CHOLEST SERPL-MCNC: 249 MG/DL (ref 100–199)
CO2 SERPL-SCNC: 28 MMOL/L (ref 20–33)
CREAT SERPL-MCNC: 0.74 MG/DL (ref 0.5–1.4)
FASTING STATUS PATIENT QL REPORTED: NORMAL
GFR SERPLBLD CREATININE-BSD FMLA CKD-EPI: 127 ML/MIN/1.73 M 2
GLOBULIN SER CALC-MCNC: 3.3 G/DL (ref 1.9–3.5)
GLUCOSE SERPL-MCNC: 100 MG/DL (ref 65–99)
HDLC SERPL-MCNC: 50 MG/DL
LDLC SERPL CALC-MCNC: 158 MG/DL
POTASSIUM SERPL-SCNC: 4.9 MMOL/L (ref 3.6–5.5)
PROT SERPL-MCNC: 8.1 G/DL (ref 6–8.2)
SODIUM SERPL-SCNC: 141 MMOL/L (ref 135–145)
TRIGL SERPL-MCNC: 207 MG/DL (ref 0–149)

## 2024-11-08 ENCOUNTER — HOSPITAL ENCOUNTER (OUTPATIENT)
Dept: RADIOLOGY | Facility: MEDICAL CENTER | Age: 29
End: 2024-11-08
Attending: INTERNAL MEDICINE
Payer: MEDICAID

## 2024-11-08 DIAGNOSIS — R07.89 OTHER CHEST PAIN: ICD-10-CM

## 2024-11-08 PROCEDURE — A9578 INJ MULTIHANCE MULTIPACK: HCPCS | Mod: UD | Performed by: INTERNAL MEDICINE

## 2024-11-08 PROCEDURE — 75561 CARDIAC MRI FOR MORPH W/DYE: CPT

## 2024-11-08 PROCEDURE — 700117 HCHG RX CONTRAST REV CODE 255: Mod: UD | Performed by: INTERNAL MEDICINE

## 2024-11-08 RX ADMIN — GADOBENATE DIMEGLUMINE 30 ML: 529 INJECTION, SOLUTION INTRAVENOUS at 13:36

## 2024-12-13 ENCOUNTER — OFFICE VISIT (OUTPATIENT)
Dept: CARDIOLOGY | Facility: MEDICAL CENTER | Age: 29
End: 2024-12-13
Attending: INTERNAL MEDICINE
Payer: MEDICAID

## 2024-12-13 VITALS
SYSTOLIC BLOOD PRESSURE: 112 MMHG | BODY MASS INDEX: 44.1 KG/M2 | HEART RATE: 100 BPM | RESPIRATION RATE: 16 BRPM | HEIGHT: 71 IN | DIASTOLIC BLOOD PRESSURE: 72 MMHG | WEIGHT: 315 LBS | OXYGEN SATURATION: 95 %

## 2024-12-13 DIAGNOSIS — I10 ESSENTIAL HYPERTENSION, BENIGN: ICD-10-CM

## 2024-12-13 DIAGNOSIS — E78.5 DYSLIPIDEMIA: ICD-10-CM

## 2024-12-13 PROCEDURE — 3074F SYST BP LT 130 MM HG: CPT | Performed by: INTERNAL MEDICINE

## 2024-12-13 PROCEDURE — 3078F DIAST BP <80 MM HG: CPT | Performed by: INTERNAL MEDICINE

## 2024-12-13 PROCEDURE — 99214 OFFICE O/P EST MOD 30 MIN: CPT | Performed by: INTERNAL MEDICINE

## 2024-12-13 PROCEDURE — 99212 OFFICE O/P EST SF 10 MIN: CPT | Performed by: INTERNAL MEDICINE

## 2024-12-13 ASSESSMENT — ENCOUNTER SYMPTOMS
GASTROINTESTINAL NEGATIVE: 1
CHILLS: 0
WEAKNESS: 0
RESPIRATORY NEGATIVE: 1
MUSCULOSKELETAL NEGATIVE: 1
ABDOMINAL PAIN: 0
DEPRESSION: 0
DIZZINESS: 0
WEIGHT LOSS: 0
FOCAL WEAKNESS: 0
NEUROLOGICAL NEGATIVE: 1
CARDIOVASCULAR NEGATIVE: 1
HEADACHES: 0
SHORTNESS OF BREATH: 0
NAUSEA: 0
COUGH: 0
BRUISES/BLEEDS EASILY: 0
EYES NEGATIVE: 1
NERVOUS/ANXIOUS: 0
BLURRED VISION: 0
FEVER: 0
PSYCHIATRIC NEGATIVE: 1
VOMITING: 0
CLAUDICATION: 0
MYALGIAS: 0
DOUBLE VISION: 0
PALPITATIONS: 0
CONSTITUTIONAL NEGATIVE: 1

## 2024-12-13 ASSESSMENT — FIBROSIS 4 INDEX: FIB4 SCORE: 0.8

## 2024-12-13 NOTE — PROGRESS NOTES
Chief Complaint   Patient presents with    Hypertension    Dyslipidemia       Subjective     Esteban Guy Jr. is a 29 y.o. male who presents today for follow up of chest pain, hypertension and hyperlipidemia.    Since the patient's last visit on 09/04/24, he has been doing well clinically from cardiac standpoint. He denies fatigue, chest pain, shortness of breath, palpitations, lower extremity edema, dizziness or syncope. He underwent unremarkable cardiac MRI as described. He has been inactive.     Past Medical History:   Diagnosis Date    Hyperlipidemia     Hypertension     Seizure (HCC)      Past Surgical History:   Procedure Laterality Date    ZZZ CARDIAC CATH       Family History   Problem Relation Age of Onset    Heart Attack Neg Hx     Heart Disease Neg Hx      Social History     Socioeconomic History    Marital status: Single     Spouse name: Not on file    Number of children: Not on file    Years of education: Not on file    Highest education level: Not on file   Occupational History    Not on file   Tobacco Use    Smoking status: Never    Smokeless tobacco: Never   Vaping Use    Vaping status: Former    Substances: THC   Substance and Sexual Activity    Alcohol use: No    Drug use: No    Sexual activity: Not on file   Other Topics Concern    Not on file   Social History Narrative    Not on file     Social Drivers of Health     Financial Resource Strain: Not on file   Food Insecurity: No Food Insecurity (7/3/2024)    Hunger Vital Sign     Worried About Running Out of Food in the Last Year: Never true     Ran Out of Food in the Last Year: Never true   Transportation Needs: No Transportation Needs (7/3/2024)    PRAPARE - Transportation     Lack of Transportation (Medical): No     Lack of Transportation (Non-Medical): No   Physical Activity: Not on file   Stress: Not on file   Social Connections: Not on file   Intimate Partner Violence: Not At Risk (7/3/2024)    Humiliation, Afraid, Rape, and Kick questionnaire      Fear of Current or Ex-Partner: No     Emotionally Abused: No     Physically Abused: No     Sexually Abused: No   Housing Stability: Low Risk  (7/3/2024)    Housing Stability Vital Sign     Unable to Pay for Housing in the Last Year: No     Number of Places Lived in the Last Year: 1     Unstable Housing in the Last Year: No     No Known Allergies    (Medications reviewed.)  Outpatient Encounter Medications as of 12/13/2024   Medication Sig Dispense Refill    metoprolol SR (TOPROL XL) 25 MG TABLET SR 24 HR Take 1 Tablet by mouth every day. Every evening. 30 Tablet 2    nitroglycerin (NITROSTAT) 0.4 MG SL Tab Place 1 Tablet under the tongue as needed for Chest Pain (up to 3 doses (if SBP greater than 90 mmHg)). 25 Tablet 1    amLODIPine (NORVASC) 2.5 MG Tab Take 1 Tablet by mouth every day. Take one tablet every morning. . 30 Tablet 2    levETIRAcetam (KEPPRA) 500 MG Tab Take 1,000 mg by mouth 2 times a day. 1,000 mg = 2 tabs      Acetaminophen (TYLENOL PO) Take 2 Tablets by mouth one time as needed (headache).      aspirin 81 MG EC tablet Take 1 Tablet by mouth every day. (Patient not taking: Reported on 12/13/2024) 100 Tablet 2     No facility-administered encounter medications on file as of 12/13/2024.     Review of Systems   Constitutional: Negative.  Negative for chills, fever, malaise/fatigue and weight loss.   HENT: Negative.  Negative for hearing loss.    Eyes: Negative.  Negative for blurred vision and double vision.   Respiratory: Negative.  Negative for cough and shortness of breath.    Cardiovascular: Negative.  Negative for chest pain, palpitations, claudication and leg swelling.   Gastrointestinal: Negative.  Negative for abdominal pain, nausea and vomiting.   Genitourinary: Negative.  Negative for dysuria and urgency.   Musculoskeletal: Negative.  Negative for joint pain and myalgias.   Skin: Negative.  Negative for itching and rash.   Neurological: Negative.  Negative for dizziness, focal weakness,  "weakness and headaches.   Endo/Heme/Allergies: Negative.  Does not bruise/bleed easily.   Psychiatric/Behavioral: Negative.  Negative for depression. The patient is not nervous/anxious.               Objective     /72 (BP Location: Left arm, Patient Position: Sitting, BP Cuff Size: Adult)   Pulse 100   Resp 16   Ht 1.803 m (5' 11\")   Wt (!) 146 kg (322 lb)   SpO2 95%   BMI 44.91 kg/m²     Physical Exam  Constitutional:       Appearance: Normal appearance. He is well-developed and normal weight.   HENT:      Head: Normocephalic and atraumatic.   Neck:      Vascular: No JVD.   Cardiovascular:      Rate and Rhythm: Normal rate and regular rhythm.      Heart sounds: Normal heart sounds.   Pulmonary:      Effort: Pulmonary effort is normal.      Breath sounds: Normal breath sounds.   Abdominal:      General: Bowel sounds are normal.      Palpations: Abdomen is soft.      Comments: No hepatosplenomegaly.   Musculoskeletal:         General: Normal range of motion.   Lymphadenopathy:      Cervical: No cervical adenopathy.   Skin:     General: Skin is warm and dry.   Neurological:      Mental Status: He is alert and oriented to person, place, and time.            CARDIAC STUDIES/PROCEDURES:     CARDIAC CATHETERIZATION CONCLUSIONS by Palomo Suarez (07/05/24)  1.  Normal-appearing epicardial coronary arteries with mild slow coronary flow in the LAD which can reflect either underlying endothelial dysfunction/microvascular disease or due to elevated LVEDP  2.  Consider myocarditis as a differential for his presentation and elevated troponin.   3.  Elevated resting LVEDP 20-25 mmHg without significant transaortic gradient on pullback    CARDIAC MRI (11.08.24)  1.  Normal left ventricular size and function.  2.  No delayed myocardial enhancement.  (study result reviewed)     ECHOCARDIOGRAM CONCLUSIONS (07/04/24)  Normal left ventricular size, wall thickness, and systolic function.   Normal diastolic function.  The " right ventricle is normal in size and systolic function.  Normal left atrial size.  No pericardial effusion.  No prior study is available for comparison.      EKG performed on (07/04/24) EKG shows sinus rhythm.      Laboratory results of (11/05/24) were reviewed. Cholesterol profile of 249/207/50/158 mg/dL noted.  Laboratory results of (07/03/24) Cholesterol profile of 159/122/31/104 mg/dL noted.    Assessment & Plan     1. Essential hypertension, benign        2. Dyslipidemia            Medical Decision Making: Today's Assessment/Status/Plan:        Chest pains with low flow in the left anterior descending artery: He is a 29 year old male with hypertension and hyperlipidemia. His chest pain has resolved and his cardiac studies were unremarkable. No further studies recommended at this time.   Hypertension: Currently, the average blood pressure is well controlled. We will continue with amlodipine, metoprolol.  Hyperlipidemia: Most recent labs demonstrates high cholesterol levels. He is working on diet modification and exercise.   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     We will see the patient as needed.    CC Emilie Devine

## 2025-01-10 DIAGNOSIS — I21.4 NSTEMI (NON-ST ELEVATED MYOCARDIAL INFARCTION) (HCC): ICD-10-CM

## 2025-01-10 RX ORDER — METOPROLOL SUCCINATE 25 MG/1
25 TABLET, EXTENDED RELEASE ORAL DAILY
Qty: 30 TABLET | Refills: 2 | Status: SHIPPED | OUTPATIENT
Start: 2025-01-10

## 2025-01-10 RX ORDER — AMLODIPINE BESYLATE 2.5 MG/1
2.5 TABLET ORAL DAILY
Qty: 30 TABLET | Refills: 2 | Status: SHIPPED | OUTPATIENT
Start: 2025-01-10

## 2025-01-10 NOTE — TELEPHONE ENCOUNTER
Received request via: Patient    Was the patient seen in the last year in this department? Yes    Does the patient have an active prescription (recently filled or refills available) for medication(s) requested? No    [facility]    Does the patient have half-way Plus and need 100-day supply? (This applies to ALL medications) Patient does not have SCP

## 2025-01-28 DIAGNOSIS — I21.4 NSTEMI (NON-ST ELEVATED MYOCARDIAL INFARCTION) (HCC): ICD-10-CM

## 2025-01-28 RX ORDER — NITROGLYCERIN 0.4 MG/1
TABLET SUBLINGUAL
Qty: 25 TABLET | Refills: 1 | Status: SHIPPED | OUTPATIENT
Start: 2025-01-28

## 2025-01-28 NOTE — TELEPHONE ENCOUNTER
Received request via: Pharmacy    Was the patient seen in the last year in this department? Yes    Does the patient have an active prescription (recently filled or refills available) for medication(s) requested? No    Pharmacy Name: [facility]    Does the patient have [facility] Plus and need 100-day supply? (This applies to ALL medications) Patient does not have SCP

## 2025-03-03 ENCOUNTER — APPOINTMENT (OUTPATIENT)
Dept: RADIOLOGY | Facility: IMAGING CENTER | Age: 30
End: 2025-03-03
Payer: MEDICAID

## 2025-03-03 ENCOUNTER — NON-PROVIDER VISIT (OUTPATIENT)
Dept: URGENT CARE | Facility: PHYSICIAN GROUP | Age: 30
End: 2025-03-03
Payer: MEDICAID

## 2025-03-03 DIAGNOSIS — M54.50 LUMBAR PAIN: ICD-10-CM

## 2025-03-03 PROCEDURE — 72110 X-RAY EXAM L-2 SPINE 4/>VWS: CPT | Mod: TC,FY | Performed by: NURSE PRACTITIONER

## 2025-03-04 ENCOUNTER — RESULTS FOLLOW-UP (OUTPATIENT)
Dept: MEDICAL GROUP | Facility: MEDICAL CENTER | Age: 30
End: 2025-03-04

## 2025-03-05 ENCOUNTER — OFFICE VISIT (OUTPATIENT)
Dept: MEDICAL GROUP | Facility: MEDICAL CENTER | Age: 30
End: 2025-03-05
Payer: MEDICAID

## 2025-03-05 VITALS
BODY MASS INDEX: 46.67 KG/M2 | OXYGEN SATURATION: 96 % | DIASTOLIC BLOOD PRESSURE: 60 MMHG | TEMPERATURE: 98.2 F | WEIGHT: 315 LBS | RESPIRATION RATE: 16 BRPM | HEART RATE: 88 BPM | SYSTOLIC BLOOD PRESSURE: 110 MMHG

## 2025-03-05 DIAGNOSIS — G47.30 SLEEP APNEA, UNSPECIFIED TYPE: ICD-10-CM

## 2025-03-05 DIAGNOSIS — E66.01 MORBID OBESITY WITH BMI OF 45.0-49.9, ADULT (HCC): ICD-10-CM

## 2025-03-05 DIAGNOSIS — M51.369 DEGENERATION OF INTERVERTEBRAL DISC OF LUMBAR REGION, UNSPECIFIED WHETHER PAIN PRESENT: ICD-10-CM

## 2025-03-05 DIAGNOSIS — G40.909 SEIZURE DISORDER (HCC): ICD-10-CM

## 2025-03-05 DIAGNOSIS — I21.4 NSTEMI (NON-ST ELEVATED MYOCARDIAL INFARCTION) (HCC): ICD-10-CM

## 2025-03-05 PROCEDURE — 3074F SYST BP LT 130 MM HG: CPT

## 2025-03-05 PROCEDURE — 99213 OFFICE O/P EST LOW 20 MIN: CPT

## 2025-03-05 PROCEDURE — 99212 OFFICE O/P EST SF 10 MIN: CPT

## 2025-03-05 PROCEDURE — 3078F DIAST BP <80 MM HG: CPT

## 2025-03-05 ASSESSMENT — PATIENT HEALTH QUESTIONNAIRE - PHQ9: CLINICAL INTERPRETATION OF PHQ2 SCORE: 0

## 2025-03-05 ASSESSMENT — FIBROSIS 4 INDEX: FIB4 SCORE: 0.8

## 2025-03-05 NOTE — PROGRESS NOTES
Verbal consent was acquired by the patient to use [employer] ambient listening note generation during this visit     Subjective:     CC:  There were no encounter diagnoses.    HISTORY OF THE PRESENT ILLNESS: Patient is a 29 y.o. male.     History of Present Illness  The patient is a 29-year-old male who presents for a follow-up on x-ray results, weight management, seizure disorder, sleep apnea, and heart attack. He is accompanied by his mother.    The patient's mother inquires about the potential impact of his previous falls during seizures on his current condition. He has not initiated any weight loss measures and has instead experienced weight gain. His diet is largely uncontrolled, often consuming whatever food is available at home, including sandwiches and pizza. His mother, who is currently unemployed due to her own back issues, recalls that he had previously lost weight on a ketogenic diet, which he found beneficial. He expresses a desire to lose weight independently before considering other options. He reports a preference for vegetables with salt but acknowledges his inability to consume salt. His mother notes that they typically consume organic vegetables, which he prefers steamed.    He has reduced his video game usage, which his mother believes may have been contributing to his seizures due to the associated sleep deprivation. His last seizure occurred a few months ago. His mother reports that his condition has improved since his sister moved from [place], providing him with emotional support. They are currently applying for Social Security disability benefits due to his history of heart attack and seizure disorder, which has been present since childhood. His mother expresses concern about his increasing weight, fearing it could reach 500 pounds, and his recent heart issues. She also mentions his severe sleep apnea, for which a test was conducted in 09/2024, but they have not received any follow-up  "communication. His mother believes that weight loss could improve his overall health, including his sleep apnea. He expresses reluctance to use a CPAP machine, citing discomfort with having something attached to his face. His mother is concerned about his mental health, suspecting depression, and notes that he has expressed a lack of will to live. She also mentions instances of him \"spacing out,\" such as when he froze while carrying a cup of water, causing it to fall. His mother is committed to supporting him but feels overwhelmed by his condition.    Health Maintenance: reviewed and completed per patient preference.     ROS:   PER HPI.           Social History     Socioeconomic History    Marital status: Single     Spouse name: Not on file    Number of children: Not on file    Years of education: Not on file    Highest education level: Not on file   Occupational History    Not on file   Tobacco Use    Smoking status: Never    Smokeless tobacco: Never   Vaping Use    Vaping status: Former    Substances: THC   Substance and Sexual Activity    Alcohol use: No    Drug use: No    Sexual activity: Not on file   Other Topics Concern    Not on file   Social History Narrative    Not on file     Social Drivers of Health     Financial Resource Strain: Not on file   Food Insecurity: No Food Insecurity (7/3/2024)    Hunger Vital Sign     Worried About Running Out of Food in the Last Year: Never true     Ran Out of Food in the Last Year: Never true   Transportation Needs: No Transportation Needs (7/3/2024)    PRAPARE - Transportation     Lack of Transportation (Medical): No     Lack of Transportation (Non-Medical): No   Physical Activity: Not on file   Stress: Not on file   Social Connections: Not on file   Intimate Partner Violence: Not At Risk (7/3/2024)    Humiliation, Afraid, Rape, and Kick questionnaire     Fear of Current or Ex-Partner: No     Emotionally Abused: No     Physically Abused: No     Sexually Abused: No "   Housing Stability: Low Risk  (7/3/2024)    Housing Stability Vital Sign     Unable to Pay for Housing in the Last Year: No     Number of Places Lived in the Last Year: 1     Unstable Housing in the Last Year: No      No Known Allergies         Current Outpatient Medications:     nitroglycerin, PLACE 1 TABLET UNDER THE TONGUE AS NEEDED FOR CHEST PAIN(UP TO 3 DOSES( IF SYSTOLIC BLOOD PRESSURE GREATER THAN 90MMHG)    amLODIPine, 2.5 mg, Oral, DAILY    metoprolol SR, 25 mg, Oral, DAILY    aspirin, 81 mg, Oral, DAILY (Patient not taking: Reported on 12/13/2024)    levETIRAcetam, 1,000 mg, Oral, BID    Acetaminophen (TYLENOL PO), 2 Tablet, Oral, Once PRN   Objective:     Exam: /60 (BP Location: Right arm, Patient Position: Sitting, BP Cuff Size: Adult long)   Pulse 88   Temp 36.8 °C (98.2 °F) (Temporal)   Resp 16   Wt (!) 152 kg (334 lb 9.6 oz)   SpO2 96%  Body mass index is 46.67 kg/m².    Physical Exam:  Constitutional: Alert, no distress, well-groomed.  Skin: Warm, dry, good turgor, no rashes in visible areas.  Eye: Equal, round and reactive, conjunctiva clear, lids normal.  ENMT: Lips without lesions, good dentition, moist mucous membranes.  Neck: Trachea midline, no masses, no thyromegaly.  Respiratory: Unlabored respiratory effort, no cough.  Abd: soft, non tender, non distended, normal BS  MSK: Normal gait, moves all extremities.  Neuro: Grossly non-focal.   Psych: Alert and oriented x3, normal affect and mood.    Labs: Reviewed    Assessment & Plan:   29 y.o. male with the following -    1. Degeneration of intervertebral disc of lumbar region, unspecified whether pain present    2. Seizure disorder ([facility])    3. Morbid obesity with BMI of 45.0-49.9, adult ([facility])    4. Sleep apnea, unspecified type    5. NSTEMI (non-ST elevated myocardial infarction) ([facility])      Assessment & Plan  1. Mild degenerative disc disease.  The x-ray results show mild degenerative disc disease and a small bone spur, likely due to  his size and weight. There is no compression deformity in the lumbar spine. He is advised to work on lowering his BMI to prevent further degeneration. A referral for a weight loss program will be initiated to explore both surgical and non-surgical options.    2. Seizure disorder.  He has a history of seizures, with the last episode occurring a couple of months ago. He should continue avoiding triggers such as lack of sleep and bright lights from video games.    3. Obesity.  His current weight is 334 pounds. He is advised to engage in 20 minutes of physical activity daily and eliminate processed foods from his diet, focusing on lean meats, vegetables, and fruits. A single cheat meal per week is permissible. He is also advised to limit his salt intake to less than 2000 mg per day. A referral for a weight loss program  to explore both surgical and non-surgical options.    4. Sleep apnea.  He has severe sleep apnea and has not received his CPAP machine despite a test being conducted in September. He is advised to use the CPAP machine once it is received, as it will help improve his symptoms and overall health. Roseline will call the company to expedite the process.    5. Heart attack.  He has a history of a heart attack. He is advised to make significant lifestyle changes, including weight loss and dietary modifications, to reduce the risk of future cardiac events.    Follow-up  The patient will follow up in 1 month.          Return in about 4 weeks (around 4/2/2025) for Weight check.    Please note that this dictation was created using voice recognition software. I have made every reasonable attempt to correct obvious errors, but I expect that there are errors of grammar and possibly content that I did not discover before finalizing the note.

## 2025-04-08 DIAGNOSIS — I21.4 NSTEMI (NON-ST ELEVATED MYOCARDIAL INFARCTION) (HCC): ICD-10-CM

## 2025-04-08 RX ORDER — METOPROLOL SUCCINATE 25 MG/1
25 TABLET, EXTENDED RELEASE ORAL EVERY EVENING
Qty: 30 TABLET | Refills: 2 | Status: SHIPPED | OUTPATIENT
Start: 2025-04-08

## 2025-04-08 NOTE — TELEPHONE ENCOUNTER
Received request via: Pharmacy    Was the patient seen in the last year in this department? Yes    Does the patient have an active prescription (recently filled or refills available) for medication(s) requested? No    [facility]    Does the patient have long-term Plus and need 100-day supply? (This applies to ALL medications) Patient does not have SCP

## 2025-04-10 DIAGNOSIS — I21.4 NSTEMI (NON-ST ELEVATED MYOCARDIAL INFARCTION) (HCC): ICD-10-CM

## 2025-04-10 NOTE — TELEPHONE ENCOUNTER
Received request via: Pharmacy    Was the patient seen in the last year in this department? Yes    Does the patient have an active prescription (recently filled or refills available) for medication(s) requested? No    [facility]    Does the patient have [facility] Plus and need 100-day supply? (This applies to ALL medications) Patient does not have SCP

## 2025-04-15 RX ORDER — AMLODIPINE BESYLATE 2.5 MG/1
2.5 TABLET ORAL DAILY
Qty: 30 TABLET | Refills: 2 | Status: SHIPPED | OUTPATIENT
Start: 2025-04-15

## 2025-04-23 ENCOUNTER — OFFICE VISIT (OUTPATIENT)
Dept: MEDICAL GROUP | Facility: MEDICAL CENTER | Age: 30
End: 2025-04-23
Payer: MEDICAID

## 2025-04-23 VITALS
TEMPERATURE: 97.8 F | WEIGHT: 315 LBS | SYSTOLIC BLOOD PRESSURE: 120 MMHG | RESPIRATION RATE: 16 BRPM | BODY MASS INDEX: 46.58 KG/M2 | DIASTOLIC BLOOD PRESSURE: 80 MMHG | OXYGEN SATURATION: 97 % | HEART RATE: 87 BPM

## 2025-04-23 DIAGNOSIS — G40.909 SEIZURE DISORDER (HCC): ICD-10-CM

## 2025-04-23 DIAGNOSIS — E66.01 MORBID OBESITY (HCC): ICD-10-CM

## 2025-04-23 DIAGNOSIS — G47.30 SLEEP APNEA, UNSPECIFIED TYPE: ICD-10-CM

## 2025-04-23 PROCEDURE — 99212 OFFICE O/P EST SF 10 MIN: CPT

## 2025-04-23 PROCEDURE — 99213 OFFICE O/P EST LOW 20 MIN: CPT

## 2025-04-23 PROCEDURE — 3079F DIAST BP 80-89 MM HG: CPT

## 2025-04-23 PROCEDURE — 3074F SYST BP LT 130 MM HG: CPT

## 2025-04-23 ASSESSMENT — PATIENT HEALTH QUESTIONNAIRE - PHQ9
SUM OF ALL RESPONSES TO PHQ QUESTIONS 1-9: 7
5. POOR APPETITE OR OVEREATING: 3 - NEARLY EVERY DAY
CLINICAL INTERPRETATION OF PHQ2 SCORE: 1

## 2025-04-23 ASSESSMENT — FIBROSIS 4 INDEX: FIB4 SCORE: 0.8

## 2025-04-23 NOTE — PROGRESS NOTES
Verbal consent was acquired by the patient to use [employer] ambient listening note generation during this visit     Subjective:     CC:  Diagnoses of Seizure disorder (HCC), Sleep apnea, unspecified type, and Morbid obesity (HCC) were pertinent to this visit.    HISTORY OF THE PRESENT ILLNESS: Patient is a 29 y.o. male.     History of Present Illness  The patient is here today to follow up on his sleep study and discuss various health concerns.    Sleep Apnea  - Diagnosed with sleep apnea in 12/2024  - Has not yet received his CPAP machine from [facility]  - Attempts to contact [facility] have been unsuccessful    Absence Seizures  - Experiencing more frequent absence seizures, occurring every 2 to 3 days over the past month  - Symptoms include disorientation, dropping objects, and falling  - Often requires guidance to his room where he sleeps for approximately 4 hours  - Constant supervision is necessary due to these episodes  - CT scan of the brain performed a few weeks ago, but results have not been communicated  - Frustration with frequent changes in neurologists and the need to repeat his medical history  - Concerns about potential radiation exposure from numerous brain scans and EEGs  - Currently taking Keppra 1000 mg twice daily, with no changes to this medication  - Follow-up appointment with the neurologist is scheduled  - Another EEG is planned    Weight Management  - Weight has remained stable since the last visit  - Efforts to lose weight have been made, but a regular diet was not sustained  - Sister is planning to start a keto diet, and they intend to join her  - Has a good support system at home    Chest Pains  - Appointment with cardiologist is planned due to concerns about occasional chest pains  - Fears of another heart attack, as he has had two previous ones    Supplemental information: None    Health Maintenance: reviewed and completed per patient preference.     ROS:   PER HPI.           Social History      Socioeconomic History    Marital status: Single     Spouse name: Not on file    Number of children: Not on file    Years of education: Not on file    Highest education level: Not on file   Occupational History    Not on file   Tobacco Use    Smoking status: Never    Smokeless tobacco: Never   Vaping Use    Vaping status: Former    Substances: THC   Substance and Sexual Activity    Alcohol use: No    Drug use: No    Sexual activity: Not on file   Other Topics Concern    Not on file   Social History Narrative    Not on file     Social Drivers of Health     Financial Resource Strain: Not on file   Food Insecurity: No Food Insecurity (7/3/2024)    Hunger Vital Sign     Worried About Running Out of Food in the Last Year: Never true     Ran Out of Food in the Last Year: Never true   Transportation Needs: No Transportation Needs (7/3/2024)    PRAPARE - Transportation     Lack of Transportation (Medical): No     Lack of Transportation (Non-Medical): No   Physical Activity: Not on file   Stress: Not on file   Social Connections: Not on file   Intimate Partner Violence: Not At Risk (7/3/2024)    Humiliation, Afraid, Rape, and Kick questionnaire     Fear of Current or Ex-Partner: No     Emotionally Abused: No     Physically Abused: No     Sexually Abused: No   Housing Stability: Low Risk  (7/3/2024)    Housing Stability Vital Sign     Unable to Pay for Housing in the Last Year: No     Number of Places Lived in the Last Year: 1     Unstable Housing in the Last Year: No      No Known Allergies         Current Outpatient Medications:     amLODIPine, 2.5 mg, Oral, DAILY    metoprolol SR, 25 mg, Oral, Q EVENING    nitroglycerin, PLACE 1 TABLET UNDER THE TONGUE AS NEEDED FOR CHEST PAIN(UP TO 3 DOSES( IF SYSTOLIC BLOOD PRESSURE GREATER THAN 90MMHG)    levETIRAcetam, 1,000 mg, Oral, BID    Acetaminophen (TYLENOL PO), 2 Tablet, Oral, Once PRN   Objective:     Exam: /80 (BP Location: Right arm, Patient Position: Sitting, BP  Cuff Size: Adult long)   Pulse 87   Temp 36.6 °C (97.8 °F) (Temporal)   Resp 16   Wt (!) 152 kg (334 lb)   SpO2 97%  Body mass index is 46.58 kg/m².    Physical Exam:  Constitutional: Alert, no distress, well-groomed.  Skin: Warm, dry, good turgor, no rashes in visible areas.  Eye: Equal, round and reactive, conjunctiva clear, lids normal.  ENMT: Lips without lesions, good dentition, moist mucous membranes.  Neck: Trachea midline, no masses, no thyromegaly.  Respiratory: Unlabored respiratory effort, no cough.  Abd: soft, non tender, non distended, normal BS  MSK: Normal gait, moves all extremities.  Neuro: Grossly non-focal.   Psych: Alert and oriented x3, normal affect and mood.    Depression Screening    Little interest or pleasure in doing things?  0 - not at all   Feeling down, depressed , or hopeless? 1 - several days   Trouble falling or staying asleep, or sleeping too much?  1 - several days   Feeling tired or having little energy?  0 - not at all   Poor appetite or overeating?  3 - nearly every day   Feeling bad about yourself - or that you are a failure or have let yourself or your family down? 0 - not at all   Trouble concentrating on things, such as reading the newspaper or watching television? 0 - not at all   Moving or speaking so slowly that other people could have noticed.  Or the opposite - being so fidgety or restless that you have been moving around a lot more than usual?  1 - several days   Thoughts that you would be better off dead, or of hurting yourself?  1 - several days   Patient Health Questionnaire Score: 7       If depressive symptoms identified deferred to follow up visit unless specifically addressed in assesment and plan.    Interpretation of PHQ-9 Total Score   Score Severity   1-4 No Depression   5-9 Mild Depression   10-14 Moderate Depression   15-19 Moderately Severe Depression   20-27 Severe Depression      Labs: Reviewed    Assessment & Plan:   29 y.o. male with the  following -    1. Seizure disorder (HCC)    2. Sleep apnea, unspecified typ    3. Morbid obesity (HCC)        Assessment & Plan  1. Sleep Apnea: Chronic.  - Completed a sleep study in 12/2024 and was diagnosed with sleep apnea.  - Referral will be sent to a different company to expedite the acquisition of the CPAP machine.  - CPAP machine is crucial for improving overall health and preventing complications.    2. Absence Seizures: Acute.  - Experiencing more frequent absence seizures, occurring every two to three days over the past month.  - Currently taking Keppra 1000 mg twice a day with no changes in dosage.  - Follow-up appointment with neurology is scheduled for next Wednesday for an EEG.  - Recent CT scan of the brain was performed; Roseline will request these records from [facility] to be added to Sian's Plan.    3. Weight Management: Stable.  - Has not gained or lost weight since the last visit.  - Encouraged to continue efforts towards weight loss, including considering a keto diet alongside his sister for better support.  - Weight loss is crucial for preventing further health issues, including heart attacks.      Follow-up  - Follow-up in 3 months to monitor progress.  - Follow-up appointment with neurology is scheduled for next Wednesday for an EEG.          Return in about 3 months (around 7/23/2025) for Weight check.    Please note that this dictation was created using voice recognition software. I have made every reasonable attempt to correct obvious errors, but I expect that there are errors of grammar and possibly content that I did not discover before finalizing the note.

## 2025-07-08 DIAGNOSIS — I21.4 NSTEMI (NON-ST ELEVATED MYOCARDIAL INFARCTION) (HCC): ICD-10-CM

## 2025-07-08 RX ORDER — METOPROLOL SUCCINATE 25 MG/1
25 TABLET, EXTENDED RELEASE ORAL EVERY EVENING
Qty: 30 TABLET | Refills: 2 | Status: SHIPPED | OUTPATIENT
Start: 2025-07-08

## 2025-07-08 NOTE — TELEPHONE ENCOUNTER
Received request via: Patient    Was the patient seen in the last year in this department? Yes    Does the patient have an active prescription (recently filled or refills available) for medication(s) requested? No    Pharmacy Name: [facility]       Does the patient have assisted Plus and need 100-day supply? (This applies to ALL medications) Patient does not have SCP